# Patient Record
Sex: FEMALE | Race: WHITE | HISPANIC OR LATINO | Employment: FULL TIME | URBAN - METROPOLITAN AREA
[De-identification: names, ages, dates, MRNs, and addresses within clinical notes are randomized per-mention and may not be internally consistent; named-entity substitution may affect disease eponyms.]

---

## 2022-04-12 RX ORDER — FLUTICASONE PROPIONATE 50 MCG
2 SPRAY, SUSPENSION (ML) NASAL DAILY
COMMUNITY
Start: 2021-09-10 | End: 2022-04-14

## 2022-04-14 ENCOUNTER — OFFICE VISIT (OUTPATIENT)
Dept: FAMILY MEDICINE CLINIC | Facility: CLINIC | Age: 37
End: 2022-04-14
Payer: COMMERCIAL

## 2022-04-14 VITALS
RESPIRATION RATE: 18 BRPM | BODY MASS INDEX: 24.24 KG/M2 | OXYGEN SATURATION: 99 % | DIASTOLIC BLOOD PRESSURE: 72 MMHG | SYSTOLIC BLOOD PRESSURE: 128 MMHG | HEART RATE: 98 BPM | TEMPERATURE: 98.1 F | HEIGHT: 64 IN | WEIGHT: 142 LBS

## 2022-04-14 DIAGNOSIS — Z11.4 ENCOUNTER FOR SCREENING FOR HIV: ICD-10-CM

## 2022-04-14 DIAGNOSIS — Z12.4 SCREENING FOR CERVICAL CANCER: ICD-10-CM

## 2022-04-14 DIAGNOSIS — R53.83 OTHER FATIGUE: ICD-10-CM

## 2022-04-14 DIAGNOSIS — R21 RASH AND NONSPECIFIC SKIN ERUPTION: Primary | ICD-10-CM

## 2022-04-14 DIAGNOSIS — Z80.3 FAMILY HISTORY OF BREAST CANCER: ICD-10-CM

## 2022-04-14 DIAGNOSIS — R11.0 NAUSEA: ICD-10-CM

## 2022-04-14 DIAGNOSIS — E78.2 MIXED HYPERLIPIDEMIA: ICD-10-CM

## 2022-04-14 DIAGNOSIS — Z12.31 ENCOUNTER FOR SCREENING MAMMOGRAM FOR MALIGNANT NEOPLASM OF BREAST: ICD-10-CM

## 2022-04-14 DIAGNOSIS — Z11.59 ENCOUNTER FOR HEPATITIS C SCREENING TEST FOR LOW RISK PATIENT: ICD-10-CM

## 2022-04-14 DIAGNOSIS — R73.9 HYPERGLYCEMIA: ICD-10-CM

## 2022-04-14 PROCEDURE — 3008F BODY MASS INDEX DOCD: CPT | Performed by: NURSE PRACTITIONER

## 2022-04-14 PROCEDURE — 99204 OFFICE O/P NEW MOD 45 MIN: CPT | Performed by: NURSE PRACTITIONER

## 2022-04-14 PROCEDURE — 3725F SCREEN DEPRESSION PERFORMED: CPT | Performed by: NURSE PRACTITIONER

## 2022-04-14 NOTE — ASSESSMENT & PLAN NOTE
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

## 2022-04-14 NOTE — PROGRESS NOTES
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  She states she no longer stressed and no longer having nausea  Her LMP was 4/10/22  Advised if this returns to f/u in office  Pt in agreement with plan  Diagnoses and all orders for this visit:    Rash and nonspecific skin eruption  -     Ambulatory Referral to Dermatology; Future    Nausea    Other fatigue  -     CBC and differential; Future  -     TSH, 3rd generation with Free T4 reflex; Future    Hyperglycemia  -     Comprehensive metabolic panel; Future  -     Hemoglobin A1C; Future    Mixed hyperlipidemia  -     Lipid panel; Future    Screening for cervical cancer  -     Ambulatory Referral to Gynecology; Future    Encounter for screening for HIV  -     HIV 1/2 Antigen/Antibody (4th Generation) w Reflex [facility]; Future    Encounter for hepatitis C screening test for low risk patient  -     Hepatitis C antibody; Future    Encounter for screening mammogram for malignant neoplasm of breast  -     Mammo screening bilateral w 3d & cad; Future    Family history of breast cancer  -     Mammo screening bilateral w 3d & cad; Future    Other orders  -     Discontinue: fluticasone (FLONASE) 50 mcg/act nasal spray; 2 sprays into each nostril daily          Subjective:      Patient ID: Kristina Lloyd is a 43 y o  female  43year old female patient here to establish care   Was receiving care across the street in the past  PMhx: no significant past medical history  States that 2 weeks ago she was vomiting  States almost everyday she was vomiting  When looking at food she was nausea  She did not take any medications for this  Her LMP 4/10/22  States the vomiting resolved since she started eating fruits  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  Rash  This is a recurrent problem  The current episode started more than 1 year ago  The problem has been waxing and waning since onset  The affected locations include the left lower leg  The rash is characterized by redness and itchiness  It is unknown if there was an exposure to a precipitant  Pertinent negatives include no congestion, cough, eye pain, fever, nail changes, rhinorrhea, sore throat or vomiting  Past treatments include topical steroids  The treatment provided mild relief  There is no history of allergies, asthma, eczema or varicella  The following portions of the patient's history were reviewed and updated as appropriate: allergies, current medications, past family history, past medical history, past social history, past surgical history and problem list BMI Counseling: Body mass index is 24 37 kg/m²  The BMI is above normal  Nutrition recommendations include encouraging healthy choices of fruits and vegetables, decreasing fast food intake, consuming healthier snacks, limiting drinks that contain sugar, increasing intake of lean protein, reducing intake of saturated and trans fat and reducing intake of cholesterol  Exercise recommendations include exercising 3-5 times per week  Rationale for BMI follow-up plan is due to patient being overweight or obese  BMI Counseling: Body mass index is 24 37 kg/m²  The BMI is below normal  Patient advised to gain weight  Rationale for BMI follow-up plan is due to patient being underweight       Depression Screening and Follow-up Plan: Patient was screened for depression during today's encounter  They screened negative with a PHQ-2 score of 0       Review of Systems   Constitutional: Negative  Negative for fever  HENT: Negative  Negative for congestion, rhinorrhea and sore throat  Eyes: Negative  Negative for pain  Respiratory: Negative  Negative for cough  Cardiovascular: Negative  Gastrointestinal: Positive for nausea (2 weeks ago has resolved)  Negative for vomiting  Endocrine: Negative  Genitourinary: Negative  Musculoskeletal: Negative  Skin: Positive for rash  Negative for nail changes  Allergic/Immunologic: Negative  Neurological: Negative  Hematological: Negative  Psychiatric/Behavioral: Negative  Objective:      /72 (BP Location: Left arm, Patient Position: Sitting, Cuff Size: Standard)   Pulse 98   Temp 98 1 °F (36 7 °C) (Tympanic)   Resp 18   Ht 5' 4" (1 626 m)   Wt 64 4 kg (142 lb)   LMP 04/10/2022   SpO2 99%   BMI 24 37 kg/m²          Physical Exam  Vitals and nursing note reviewed  Constitutional:       General: She is not in acute distress  Appearance: Normal appearance  She is not ill-appearing  HENT:      Head: Normocephalic and atraumatic  Right Ear: External ear normal       Left Ear: External ear normal       Nose: Nose normal  No congestion or rhinorrhea  Mouth/Throat:      Pharynx: Oropharynx is clear  No oropharyngeal exudate  Eyes:      Conjunctiva/sclera: Conjunctivae normal    Cardiovascular:      Rate and Rhythm: Normal rate and regular rhythm  Pulses: Normal pulses  Heart sounds: Normal heart sounds  Pulmonary:      Effort: Pulmonary effort is normal  No respiratory distress  Breath sounds: Normal breath sounds  Abdominal:      General: Bowel sounds are normal  There is no distension  Palpations: Abdomen is soft  Tenderness: There is no abdominal tenderness   There is no right CVA tenderness or left CVA tenderness  Musculoskeletal:         General: Normal range of motion  Cervical back: Normal range of motion and neck supple  Skin:     General: Skin is warm and dry  Capillary Refill: Capillary refill takes less than 2 seconds  Findings: Erythema and rash present  Neurological:      General: No focal deficit present  Mental Status: She is alert and oriented to person, place, and time     Psychiatric:         Mood and Affect: Mood normal          Behavior: Behavior normal

## 2022-04-14 NOTE — ASSESSMENT & PLAN NOTE
-pt reports rash appeared years ago and does not resolve  States [place] and since then the rash has never gone fully away  States it itches her and turns red  She has used OTC hydrocortisone and topical steroids which help but do not take the rash away  I recommended possibly seeing derm as her rash has not resolved despite using treatment  Pt in agreement will refer to derm

## 2022-04-18 ENCOUNTER — APPOINTMENT (OUTPATIENT)
Dept: LAB | Facility: HOSPITAL | Age: 37
End: 2022-04-18
Payer: COMMERCIAL

## 2022-04-18 DIAGNOSIS — E78.2 MIXED HYPERLIPIDEMIA: ICD-10-CM

## 2022-04-18 DIAGNOSIS — Z11.59 ENCOUNTER FOR HEPATITIS C SCREENING TEST FOR LOW RISK PATIENT: ICD-10-CM

## 2022-04-18 DIAGNOSIS — Z11.4 ENCOUNTER FOR SCREENING FOR HIV: ICD-10-CM

## 2022-04-18 DIAGNOSIS — R73.9 HYPERGLYCEMIA: ICD-10-CM

## 2022-04-18 DIAGNOSIS — R53.83 OTHER FATIGUE: ICD-10-CM

## 2022-04-18 LAB
ALBUMIN SERPL BCP-MCNC: 4.2 G/DL (ref 3–5.2)
ALP SERPL-CCNC: 64 U/L (ref 43–122)
ALT SERPL W P-5'-P-CCNC: 15 U/L
ANION GAP SERPL CALCULATED.3IONS-SCNC: 5 MMOL/L (ref 5–14)
AST SERPL W P-5'-P-CCNC: 29 U/L (ref 14–36)
BASOPHILS # BLD AUTO: 0.04 THOUSANDS/ΜL (ref 0–0.1)
BASOPHILS NFR BLD AUTO: 1 % (ref 0–1)
BILIRUB SERPL-MCNC: 0.52 MG/DL
BUN SERPL-MCNC: 19 MG/DL (ref 5–25)
CALCIUM SERPL-MCNC: 9 MG/DL (ref 8.4–10.2)
CHLORIDE SERPL-SCNC: 104 MMOL/L (ref 97–108)
CHOLEST SERPL-MCNC: 202 MG/DL
CO2 SERPL-SCNC: 30 MMOL/L (ref 22–30)
CREAT SERPL-MCNC: 0.76 MG/DL (ref 0.6–1.2)
EOSINOPHIL # BLD AUTO: 0.15 THOUSAND/ΜL (ref 0–0.61)
EOSINOPHIL NFR BLD AUTO: 3 % (ref 0–6)
ERYTHROCYTE [DISTWIDTH] IN BLOOD BY AUTOMATED COUNT: 15.5 % (ref 11.6–15.1)
EST. AVERAGE GLUCOSE BLD GHB EST-MCNC: 108 MG/DL
GFR SERPL CREATININE-BSD FRML MDRD: 99 ML/MIN/1.73SQ M
GLUCOSE P FAST SERPL-MCNC: 89 MG/DL (ref 70–99)
HBA1C MFR BLD: 5.4 %
HCT VFR BLD AUTO: 31.1 % (ref 34.8–46.1)
HCV AB SER QL: NORMAL
HDLC SERPL-MCNC: 81 MG/DL
HGB BLD-MCNC: 8.9 G/DL (ref 11.5–15.4)
IMM GRANULOCYTES # BLD AUTO: 0.01 THOUSAND/UL (ref 0–0.2)
IMM GRANULOCYTES NFR BLD AUTO: 0 % (ref 0–2)
LDLC SERPL CALC-MCNC: 96 MG/DL
LYMPHOCYTES # BLD AUTO: 2.18 THOUSANDS/ΜL (ref 0.6–4.47)
LYMPHOCYTES NFR BLD AUTO: 38 % (ref 14–44)
MCH RBC QN AUTO: 24.2 PG (ref 26.8–34.3)
MCHC RBC AUTO-ENTMCNC: 28.6 G/DL (ref 31.4–37.4)
MCV RBC AUTO: 85 FL (ref 82–98)
MONOCYTES # BLD AUTO: 0.48 THOUSAND/ΜL (ref 0.17–1.22)
MONOCYTES NFR BLD AUTO: 8 % (ref 4–12)
NEUTROPHILS # BLD AUTO: 2.87 THOUSANDS/ΜL (ref 1.85–7.62)
NEUTS SEG NFR BLD AUTO: 50 % (ref 43–75)
NONHDLC SERPL-MCNC: 121 MG/DL
NRBC BLD AUTO-RTO: 0 /100 WBCS
PLATELET # BLD AUTO: 316 THOUSANDS/UL (ref 149–390)
PMV BLD AUTO: 10.5 FL (ref 8.9–12.7)
POTASSIUM SERPL-SCNC: 3.8 MMOL/L (ref 3.6–5)
PROT SERPL-MCNC: 7.7 G/DL (ref 5.9–8.4)
RBC # BLD AUTO: 3.68 MILLION/UL (ref 3.81–5.12)
SODIUM SERPL-SCNC: 139 MMOL/L (ref 137–147)
TRIGL SERPL-MCNC: 125 MG/DL
TSH SERPL DL<=0.05 MIU/L-ACNC: 4.03 UIU/ML (ref 0.45–4.5)
WBC # BLD AUTO: 5.73 THOUSAND/UL (ref 4.31–10.16)

## 2022-04-18 PROCEDURE — 36415 COLL VENOUS BLD VENIPUNCTURE: CPT

## 2022-04-18 PROCEDURE — 80053 COMPREHEN METABOLIC PANEL: CPT

## 2022-04-18 PROCEDURE — 87389 HIV-1 AG W/HIV-1&-2 AB AG IA: CPT

## 2022-04-18 PROCEDURE — 80061 LIPID PANEL: CPT

## 2022-04-18 PROCEDURE — 85025 COMPLETE CBC W/AUTO DIFF WBC: CPT

## 2022-04-18 PROCEDURE — 83036 HEMOGLOBIN GLYCOSYLATED A1C: CPT

## 2022-04-18 PROCEDURE — 86803 HEPATITIS C AB TEST: CPT

## 2022-04-18 PROCEDURE — 84443 ASSAY THYROID STIM HORMONE: CPT

## 2022-04-19 LAB — HIV 1+2 AB+HIV1 P24 AG SERPL QL IA: NORMAL

## 2022-05-26 ENCOUNTER — OFFICE VISIT (OUTPATIENT)
Dept: FAMILY MEDICINE CLINIC | Facility: CLINIC | Age: 37
End: 2022-05-26
Payer: COMMERCIAL

## 2022-05-26 VITALS
BODY MASS INDEX: 24.41 KG/M2 | HEART RATE: 95 BPM | HEIGHT: 64 IN | WEIGHT: 143 LBS | DIASTOLIC BLOOD PRESSURE: 70 MMHG | TEMPERATURE: 97.5 F | RESPIRATION RATE: 18 BRPM | OXYGEN SATURATION: 99 % | SYSTOLIC BLOOD PRESSURE: 126 MMHG

## 2022-05-26 DIAGNOSIS — D64.9 ANEMIA, UNSPECIFIED TYPE: ICD-10-CM

## 2022-05-26 DIAGNOSIS — Z00.00 ANNUAL PHYSICAL EXAM: Primary | ICD-10-CM

## 2022-05-26 PROCEDURE — 3008F BODY MASS INDEX DOCD: CPT | Performed by: NURSE PRACTITIONER

## 2022-05-26 PROCEDURE — 3725F SCREEN DEPRESSION PERFORMED: CPT | Performed by: NURSE PRACTITIONER

## 2022-05-26 PROCEDURE — 99395 PREV VISIT EST AGE 18-39: CPT | Performed by: NURSE PRACTITIONER

## 2022-05-26 RX ORDER — FERROUS SULFATE TAB EC 324 MG (65 MG FE EQUIVALENT) 324 (65 FE) MG
324 TABLET DELAYED RESPONSE ORAL
Qty: 60 TABLET | Refills: 3 | Status: SHIPPED | OUTPATIENT
Start: 2022-05-26

## 2022-05-26 NOTE — PATIENT INSTRUCTIONS

## 2022-05-26 NOTE — PROGRESS NOTES
ADULT ANNUAL [address] [facility] PRIMARY CARE [facility]    NAME: Tomi Aviles  AGE: 43 y o  SEX: female  : 1985     DATE: 2022     Assessment and Plan:     Problem List Items Addressed This Visit        Other    Annual physical exam - Primary     -Patient recommended healthy eating habits  Health risk assessment was discussed with patient also and the ways to stay healthier  Recommended a exercising frequently at least 5 days a week for 30 minutes at a time; such as joining a gym if not already enrolled or walking  Eating low-fat and low-cholesterol foods with avoidance of saturated fats or fried foods  Immunizations, and the need to comply with current CDC's recommendations were discussed  To follow up yearly for physical exams  Anemia    Relevant Medications    ferrous sulfate 324 (65 Fe) mg          Immunizations and preventive care screenings were discussed with patient today  Appropriate education was printed on patient's after visit summary  Counseling:  Alcohol/drug use: discussed moderation in alcohol intake, the recommendations for healthy alcohol use, and avoidance of illicit drug use  Dental Health: discussed importance of regular tooth brushing, flossing, and dental visits  Injury prevention: discussed safety/seat belts, safety helmets, smoke detectors, carbon dioxide detectors, and smoking near bedding or upholstery  Sexual health: discussed sexually transmitted diseases, partner selection, use of condoms, avoidance of unintended pregnancy, and contraceptive alternatives  · Exercise: the importance of regular exercise/physical activity was discussed  Recommend exercise 3-5 times per week for at least 30 minutes  BMI Counseling: Body mass index is 24 55 kg/m²   The BMI is above normal  Nutrition recommendations include encouraging healthy choices of fruits and vegetables, decreasing fast food intake, consuming healthier snacks, limiting drinks that contain sugar, increasing intake of lean protein, reducing intake of saturated and trans fat and reducing intake of cholesterol  Exercise recommendations include exercising 3-5 times per week  Rationale for BMI follow-up plan is due to patient being overweight or obese  BMI Counseling: Body mass index is 24 55 kg/m²  The BMI is below normal  Patient advised to gain weight  Rationale for BMI follow-up plan is due to patient being underweight  Depression Screening and Follow-up Plan: Patient was screened for depression during today's encounter  They screened negative with a PHQ-2 score of 0  No follow-ups on file  Chief Complaint:     Chief Complaint   Patient presents with    Physical Exam      History of Present Illness:     Adult Annual Physical   Patient here for a comprehensive physical exam  The patient reports no problems  Diet and Physical Activity  · Diet/Nutrition: well balanced diet and consuming 3-5 servings of fruits/vegetables daily  · Exercise: walking  Depression Screening  PHQ-2/9 Depression Screening    Little interest or pleasure in doing things: 0 - not at all  Feeling down, depressed, or hopeless: 0 - not at all  PHQ-2 Score: 0  PHQ-2 Interpretation: Negative depression screen       General Health  · Sleep: sleeps well  · Hearing: normal - bilateral   · Vision: no vision problems  · Dental: no dental visits for >1 year  /GYN Health  · Last menstrual period: 4/10/22  · Contraceptive method: nothing not active  · History of STDs?: no      Review of Systems:     Review of Systems   Constitutional: Negative  HENT: Negative  Eyes: Negative  Respiratory: Negative  Cardiovascular: Negative  Gastrointestinal: Negative  Endocrine: Negative  Genitourinary: Negative  Musculoskeletal: Negative  Skin: Negative  Allergic/Immunologic: Negative  Neurological: Negative      Hematological: Negative  Psychiatric/Behavioral: Negative  Past Medical History:     Past Medical History:   Diagnosis Date    Allergic     Anemia       Past Surgical History:     History reviewed  No pertinent surgical history  Social History:     Social History     Socioeconomic History    Marital status: Single     Spouse name: None    Number of children: None    Years of education: None    Highest education level: None   Occupational History    None   Tobacco Use    Smoking status: Never Smoker    Smokeless tobacco: Never Used   Substance and Sexual Activity    Alcohol use: Never    Drug use: Never    Sexual activity: Yes     Partners: Male     Birth control/protection: None   Other Topics Concern    None   Social History Narrative    None     Social Determinants of Health     Financial Resource Strain: Not on file   Food Insecurity: Not on file   Transportation Needs: Not on file   Physical Activity: Not on file   Stress: Not on file   Social Connections: Not on file   Intimate Partner Violence: Not on file   Housing Stability: Not on file      Family History:     Family History   Problem Relation Age of Onset    Breast cancer Mother       Current Medications:     Current Outpatient Medications   Medication Sig Dispense Refill    ferrous sulfate 324 (65 Fe) mg Take 1 tablet (324 mg total) by mouth 2 (two) times a day before meals 60 tablet 3     No current facility-administered medications for this visit  Allergies:     No Known Allergies   Physical Exam:     /70 (BP Location: Left arm, Patient Position: Sitting, Cuff Size: Standard)   Pulse 95   Temp 97 5 °F (36 4 °C) (Tympanic)   Resp 18   Ht 5' 4" (1 626 m)   Wt 64 9 kg (143 lb)   SpO2 99%   BMI 24 55 kg/m²     Physical Exam  Vitals and nursing note reviewed  Constitutional:       General: She is not in acute distress  Appearance: Normal appearance  She is not ill-appearing  HENT:      Head: Normocephalic and atraumatic  Right Ear: Tympanic membrane, ear canal and external ear normal  There is no impacted cerumen  Left Ear: Tympanic membrane, ear canal and external ear normal  There is no impacted cerumen  Nose: Nose normal  No congestion or rhinorrhea  Mouth/Throat:      Mouth: Mucous membranes are moist       Pharynx: Oropharynx is clear  No oropharyngeal exudate or posterior oropharyngeal erythema  Eyes:      Extraocular Movements: Extraocular movements intact  Conjunctiva/sclera: Conjunctivae normal       Pupils: Pupils are equal, round, and reactive to light  Cardiovascular:      Rate and Rhythm: Normal rate  Pulses: Normal pulses  Heart sounds: Normal heart sounds  Pulmonary:      Effort: Pulmonary effort is normal       Breath sounds: Normal breath sounds  Abdominal:      General: Bowel sounds are normal       Palpations: Abdomen is soft  Musculoskeletal:         General: No tenderness or deformity  Normal range of motion  Cervical back: Normal range of motion and neck supple  Skin:     General: Skin is warm and dry  Capillary Refill: Capillary refill takes less than 2 seconds  Neurological:      General: No focal deficit present  Mental Status: She is alert and oriented to person, place, and time     Psychiatric:         Mood and Affect: Mood normal          Behavior: Behavior normal           Isabella Valdes, [address]

## 2022-10-11 PROBLEM — Z12.4 SCREENING FOR CERVICAL CANCER: Status: RESOLVED | Noted: 2022-04-14 | Resolved: 2022-10-11

## 2023-11-29 ENCOUNTER — TELEPHONE (OUTPATIENT)
Dept: FAMILY MEDICINE CLINIC | Facility: CLINIC | Age: 38
End: 2023-11-29

## 2023-12-12 ENCOUNTER — OFFICE VISIT (OUTPATIENT)
Dept: FAMILY MEDICINE CLINIC | Facility: CLINIC | Age: 38
End: 2023-12-12
Payer: COMMERCIAL

## 2023-12-12 VITALS
TEMPERATURE: 97.9 F | DIASTOLIC BLOOD PRESSURE: 70 MMHG | OXYGEN SATURATION: 98 % | RESPIRATION RATE: 16 BRPM | HEART RATE: 86 BPM | BODY MASS INDEX: 26.68 KG/M2 | SYSTOLIC BLOOD PRESSURE: 102 MMHG | WEIGHT: 145 LBS | HEIGHT: 62 IN

## 2023-12-12 DIAGNOSIS — Z00.00 ANNUAL PHYSICAL EXAM: Primary | ICD-10-CM

## 2023-12-12 DIAGNOSIS — Z13.6 SCREENING FOR CARDIOVASCULAR CONDITION: ICD-10-CM

## 2023-12-12 DIAGNOSIS — L30.4 INTERTRIGO: ICD-10-CM

## 2023-12-12 DIAGNOSIS — Z12.31 SCREENING MAMMOGRAM FOR BREAST CANCER: ICD-10-CM

## 2023-12-12 DIAGNOSIS — D64.9 ANEMIA, UNSPECIFIED TYPE: ICD-10-CM

## 2023-12-12 PROBLEM — R11.0 NAUSEA: Status: RESOLVED | Noted: 2022-04-14 | Resolved: 2023-12-12

## 2023-12-12 PROBLEM — R21 RASH AND NONSPECIFIC SKIN ERUPTION: Status: RESOLVED | Noted: 2022-04-14 | Resolved: 2023-12-12

## 2023-12-12 PROCEDURE — 99213 OFFICE O/P EST LOW 20 MIN: CPT | Performed by: PHYSICIAN ASSISTANT

## 2023-12-12 PROCEDURE — 99396 PREV VISIT EST AGE 40-64: CPT | Performed by: PHYSICIAN ASSISTANT

## 2023-12-12 RX ORDER — NYSTATIN 100000 U/G
OINTMENT TOPICAL 2 TIMES DAILY
Qty: 30 G | Refills: 0 | Status: SHIPPED | OUTPATIENT
Start: 2023-12-12

## 2023-12-12 NOTE — ASSESSMENT & PLAN NOTE
BMI Counseling: Body mass index is 26.52 kg/m². The BMI is above normal. Nutrition recommendations include reducing portion sizes, decreasing overall calorie intake, 3-5 servings of fruits/vegetables daily, reducing fast food intake, and consuming healthier snacks. Exercise recommendations include exercising 3-5 times per week and strength training exercises.

## 2023-12-12 NOTE — ASSESSMENT & PLAN NOTE
Hyperpigmentation of mons pubis and folds between legs x 3 months. Start topical nystatin, follow-up in 2 weeks if no improvement.

## 2023-12-12 NOTE — PROGRESS NOTES
[address] [place] [address] PRIMARY CARE [facility]    NAME: [address]  AGE: 34 y.o. SEX: female  : 1985     DATE: 2023     Assessment and Plan:     Problem List Items Addressed This Visit        Musculoskeletal and Integument    Intertrigo     Hyperpigmentation of mons pubis and folds between legs x 3 months. Start topical nystatin, follow-up in 2 weeks if no improvement. Relevant Medications    nystatin (MYCOSTATIN) ointment       Other    Anemia     Lab Results   Component Value Date    WBC 5.73 2022    HGB 8.9 (L) 2022    HCT 31.1 (L) 2022    MCV 85 2022     2022     Unclear cause, regular menstrual periods lasting 3 days, no bloody stools, repeat labs, symptomatic with fatigue, consider EGD/[place] pending repeat lab. Relevant Orders    Iron Panel (Includes Ferritin, Iron Sat%, Iron, and TIBC)    Reticulocytes    Vitamin B12    Folate    RESOLVED: Annual physical exam - Primary    BMI 26.0-26.9,adult     BMI Counseling: Body mass index is 26.52 kg/m². The BMI is above normal. Nutrition recommendations include reducing portion sizes, decreasing overall calorie intake, 3-5 servings of fruits/vegetables daily, reducing fast food intake, and consuming healthier snacks. Exercise recommendations include exercising 3-5 times per week and strength training exercises. Other Visit Diagnoses     Screening mammogram for breast cancer        Relevant Orders    Mammo screening bilateral w 3d & cad    Screening for cardiovascular condition        Relevant Orders    CBC and differential    Comprehensive metabolic panel    Lipid panel            Immunizations and preventive care screenings were discussed with patient today. Appropriate education was printed on patient's after visit summary.     Counseling:  Alcohol/drug use: discussed moderation in alcohol intake, the recommendations for healthy alcohol use, and avoidance of illicit drug use. Dental Health: discussed importance of regular tooth brushing, flossing, and dental visits. Injury prevention: discussed safety/seat belts, safety helmets, smoke detectors, carbon dioxide detectors, and smoking near bedding or upholstery. Sexual health: discussed sexually transmitted diseases, partner selection, use of condoms, avoidance of unintended pregnancy, and contraceptive alternatives. Exercise: the importance of regular exercise/physical activity was discussed. Recommend exercise 3-5 times per week for at least 30 minutes. Depression Screening and Follow-up Plan: Patient was screened for depression during today's encounter. They screened negative with a PHQ-2 score of 0. Return for pap smear . Chief Complaint:     Chief Complaint   Patient presents with   • Physical Exam      History of Present Illness:     Adult Annual Physical   Patient here for a comprehensive physical exam. The patient reports problems - rash in genital region . Working in packing. Staying active, not sweating much, no recent travel or swimming. Shaving in the mons pubis region with new razor every time, noticing darkening of skin, no itching or swelling. Has not had Pap smear screening in several years. Never did mammogram before. History was conducted in [place] without the use of . Diet and Physical Activity  Diet/Nutrition: well balanced diet. Exercise: walking. Depression Screening  PHQ-2/9 Depression Screening    Little interest or pleasure in doing things: 0 - not at all  Feeling down, depressed, or hopeless: 0 - not at all  PHQ-2 Score: 0  PHQ-2 Interpretation: Negative depression screen       General Health  Sleep: sleeps well. Hearing: normal - bilateral.  Vision: no vision problems. Dental: no dental visits for >1 year and brushes teeth twice daily.        /GYN Health  Follows with gynecology? no   Patient is: premenopausal  Last menstrual period: 11/28/23, 3 days   Contraceptive method:  none . Advanced Care Planning  Do you have an advanced directive? no  Do you have a durable medical power of ? no     Review of Systems:     Review of Systems   Constitutional:  Negative for chills and fever. HENT:  Negative for ear pain and sore throat. Eyes:  Negative for pain and visual disturbance. Respiratory:  Negative for cough and shortness of breath. Cardiovascular:  Negative for chest pain and palpitations. Gastrointestinal:  Negative for abdominal pain and vomiting. Genitourinary:  Negative for dysuria and hematuria. Musculoskeletal:  Negative for arthralgias and back pain. Skin:  Positive for rash. Negative for color change. Neurological:  Negative for seizures and syncope. All other systems reviewed and are negative. Past Medical History:     Past Medical History:   Diagnosis Date   • Allergic    • Anemia       Past Surgical History:     History reviewed. No pertinent surgical history.    Social History:     Social History     Socioeconomic History   • Marital status: Single     Spouse name: None   • Number of children: None   • Years of education: None   • Highest education level: None   Occupational History   • None   Tobacco Use   • Smoking status: Never   • Smokeless tobacco: Never   Substance and Sexual Activity   • Alcohol use: Never   • Drug use: Never   • Sexual activity: Yes     Partners: Male     Birth control/protection: None   Other Topics Concern   • None   Social History Narrative   • None     Social Determinants of Health     Financial Resource Strain: Not on file   Food Insecurity: Not on file   Transportation Needs: Not on file   Physical Activity: Not on file   Stress: Not on file   Social Connections: Not on file   Intimate Partner Violence: Not on file   Housing Stability: Not on file      Family History:     Family History   Problem Relation Age of Onset   • Breast cancer Mother       Current Medications:     Current Outpatient Medications   Medication Sig Dispense Refill   • nystatin (MYCOSTATIN) ointment Apply topically 2 (two) times a day 30 g 0     No current facility-administered medications for this visit. Allergies:     No Known Allergies   Physical Exam:     /70 (BP Location: Left arm, Patient Position: Sitting, Cuff Size: Standard)   Pulse 86   Temp 97.9 °F (36.6 °C) (Tympanic)   Resp 16   Ht 5' 2" (1.575 m)   Wt 65.8 kg (145 lb)   SpO2 98%   BMI 26.52 kg/m²     Physical Exam  Vitals and nursing note reviewed. Constitutional:       General: She is not in acute distress. Appearance: She is well-developed. HENT:      Head: Normocephalic and atraumatic. Right Ear: Tympanic membrane, ear canal and external ear normal.      Left Ear: Tympanic membrane, ear canal and external ear normal.   Eyes:      Extraocular Movements: Extraocular movements intact. Conjunctiva/sclera: Conjunctivae normal.      Pupils: Pupils are equal, round, and reactive to light. Cardiovascular:      Rate and Rhythm: Normal rate and regular rhythm. Heart sounds: No murmur heard. Pulmonary:      Effort: Pulmonary effort is normal. No respiratory distress. Breath sounds: Normal breath sounds. Abdominal:      Palpations: Abdomen is soft. Tenderness: There is no abdominal tenderness. Musculoskeletal:         General: No swelling. Cervical back: Neck supple. Skin:     General: Skin is warm and dry. Capillary Refill: Capillary refill takes less than 2 seconds. Neurological:      Mental Status: She is alert.    Psychiatric:         Mood and Affect: Mood normal.          Kellie Connelly PA-C  [address]

## 2023-12-12 NOTE — ASSESSMENT & PLAN NOTE
Lab Results   Component Value Date    WBC 5.73 04/18/2022    HGB 8.9 (L) 04/18/2022    HCT 31.1 (L) 04/18/2022    MCV 85 04/18/2022     04/18/2022     Unclear cause, regular menstrual periods lasting 3 days, no bloody stools, repeat labs, symptomatic with fatigue, consider EGD/[place] pending repeat lab.

## 2023-12-16 ENCOUNTER — LAB (OUTPATIENT)
Dept: LAB | Facility: HOSPITAL | Age: 38
End: 2023-12-16
Payer: COMMERCIAL

## 2023-12-16 DIAGNOSIS — Z13.6 SCREENING FOR CARDIOVASCULAR CONDITION: ICD-10-CM

## 2023-12-16 DIAGNOSIS — D64.9 ANEMIA, UNSPECIFIED TYPE: ICD-10-CM

## 2023-12-16 LAB
ALBUMIN SERPL BCP-MCNC: 4.5 G/DL (ref 3.5–5)
ALP SERPL-CCNC: 54 U/L (ref 34–104)
ALT SERPL W P-5'-P-CCNC: 9 U/L (ref 7–52)
ANION GAP SERPL CALCULATED.3IONS-SCNC: 8 MMOL/L
ANISOCYTOSIS BLD QL SMEAR: PRESENT
AST SERPL W P-5'-P-CCNC: 17 U/L (ref 13–39)
BASOPHILS # BLD AUTO: 0.04 THOUSANDS/ÂΜL (ref 0–0.1)
BASOPHILS NFR BLD AUTO: 1 % (ref 0–1)
BILIRUB SERPL-MCNC: 0.53 MG/DL (ref 0.2–1)
BUN SERPL-MCNC: 20 MG/DL (ref 5–25)
CALCIUM SERPL-MCNC: 9.1 MG/DL (ref 8.4–10.2)
CHLORIDE SERPL-SCNC: 104 MMOL/L (ref 96–108)
CHOLEST SERPL-MCNC: 189 MG/DL
CO2 SERPL-SCNC: 26 MMOL/L (ref 21–32)
CREAT SERPL-MCNC: 0.74 MG/DL (ref 0.6–1.3)
EOSINOPHIL # BLD AUTO: 0.16 THOUSAND/ÂΜL (ref 0–0.61)
EOSINOPHIL NFR BLD AUTO: 3 % (ref 0–6)
ERYTHROCYTE [DISTWIDTH] IN BLOOD BY AUTOMATED COUNT: 15.1 % (ref 11.6–15.1)
FERRITIN SERPL-MCNC: 6 NG/ML (ref 11–307)
FOLATE SERPL-MCNC: >22.3 NG/ML
GFR SERPL CREATININE-BSD FRML MDRD: 101 ML/MIN/1.73SQ M
GLUCOSE P FAST SERPL-MCNC: 93 MG/DL (ref 65–99)
HCT VFR BLD AUTO: 31.7 % (ref 34.8–46.1)
HDLC SERPL-MCNC: 70 MG/DL
HGB BLD-MCNC: 10 G/DL (ref 11.5–15.4)
IMM GRANULOCYTES # BLD AUTO: 0.01 THOUSAND/UL (ref 0–0.2)
IMM GRANULOCYTES NFR BLD AUTO: 0 % (ref 0–2)
IRON SATN MFR SERPL: 14 % (ref 15–50)
IRON SERPL-MCNC: 57 UG/DL (ref 50–212)
LDLC SERPL CALC-MCNC: 106 MG/DL (ref 0–100)
LYMPHOCYTES # BLD AUTO: 1.72 THOUSANDS/ÂΜL (ref 0.6–4.47)
LYMPHOCYTES NFR BLD AUTO: 35 % (ref 14–44)
MCH RBC QN AUTO: 26.2 PG (ref 26.8–34.3)
MCHC RBC AUTO-ENTMCNC: 31.5 G/DL (ref 31.4–37.4)
MCV RBC AUTO: 83 FL (ref 82–98)
MONOCYTES # BLD AUTO: 0.5 THOUSAND/ÂΜL (ref 0.17–1.22)
MONOCYTES NFR BLD AUTO: 10 % (ref 4–12)
NEUTROPHILS # BLD AUTO: 2.56 THOUSANDS/ÂΜL (ref 1.85–7.62)
NEUTS SEG NFR BLD AUTO: 51 % (ref 43–75)
NONHDLC SERPL-MCNC: 119 MG/DL
NRBC BLD AUTO-RTO: 0 /100 WBCS
PLATELET # BLD AUTO: 259 THOUSANDS/UL (ref 149–390)
PLATELET BLD QL SMEAR: ADEQUATE
PMV BLD AUTO: 11 FL (ref 8.9–12.7)
POTASSIUM SERPL-SCNC: 4.2 MMOL/L (ref 3.5–5.3)
PROT SERPL-MCNC: 7.7 G/DL (ref 6.4–8.4)
RBC # BLD AUTO: 3.81 MILLION/UL (ref 3.81–5.12)
RBC MORPH BLD: PRESENT
RETICS # AUTO: NORMAL 10*3/UL (ref 14097–95744)
RETICS # CALC: 0.66 % (ref 0.37–1.87)
SCHISTOCYTES BLD QL SMEAR: PRESENT
SODIUM SERPL-SCNC: 138 MMOL/L (ref 135–147)
TIBC SERPL-MCNC: 416 UG/DL (ref 250–450)
TRIGL SERPL-MCNC: 67 MG/DL
UIBC SERPL-MCNC: 359 UG/DL (ref 155–355)
VIT B12 SERPL-MCNC: 255 PG/ML (ref 180–914)
WBC # BLD AUTO: 4.99 THOUSAND/UL (ref 4.31–10.16)

## 2023-12-16 PROCEDURE — 36415 COLL VENOUS BLD VENIPUNCTURE: CPT

## 2023-12-16 PROCEDURE — 82728 ASSAY OF FERRITIN: CPT

## 2023-12-16 PROCEDURE — 82607 VITAMIN B-12: CPT

## 2023-12-16 PROCEDURE — 80053 COMPREHEN METABOLIC PANEL: CPT

## 2023-12-16 PROCEDURE — 85025 COMPLETE CBC W/AUTO DIFF WBC: CPT

## 2023-12-16 PROCEDURE — 80061 LIPID PANEL: CPT

## 2023-12-16 PROCEDURE — 83540 ASSAY OF IRON: CPT

## 2023-12-16 PROCEDURE — 85045 AUTOMATED RETICULOCYTE COUNT: CPT

## 2023-12-16 PROCEDURE — 83550 IRON BINDING TEST: CPT

## 2023-12-16 PROCEDURE — 82746 ASSAY OF FOLIC ACID SERUM: CPT

## 2023-12-20 DIAGNOSIS — D50.8 OTHER IRON DEFICIENCY ANEMIA: Primary | ICD-10-CM

## 2023-12-20 RX ORDER — FERROUS SULFATE 324(65)MG
324 TABLET, DELAYED RELEASE (ENTERIC COATED) ORAL
Qty: 60 TABLET | Refills: 5 | Status: SHIPPED | OUTPATIENT
Start: 2023-12-20

## 2023-12-20 NOTE — PROGRESS NOTES
I reviewed patient's blood work as it reports schistocytes and anisocytosis, low ferritin, low iron saturation, high UIBC and Borderline low vitamin B-12.   Normal folate.  0.66 RPI.    Patient has anemia due to iron deficiency.    Further consideration about thrombotic microangiopathy TMA, due to  schistocytosis.    Need for further testing to confirm B12: MMA and homocysteine. (?)    Interesting the poor response of reticulocytes production. Likely to be chronic, concurrent hemoglobinopathy needs to be considered.    Follow up after iron repletion.

## 2024-01-17 ENCOUNTER — OFFICE VISIT (OUTPATIENT)
Dept: FAMILY MEDICINE CLINIC | Facility: CLINIC | Age: 39
End: 2024-01-17
Payer: COMMERCIAL

## 2024-01-17 VITALS
HEART RATE: 88 BPM | OXYGEN SATURATION: 99 % | RESPIRATION RATE: 17 BRPM | BODY MASS INDEX: 27.31 KG/M2 | DIASTOLIC BLOOD PRESSURE: 70 MMHG | SYSTOLIC BLOOD PRESSURE: 116 MMHG | TEMPERATURE: 97 F | WEIGHT: 148.4 LBS | HEIGHT: 62 IN

## 2024-01-17 DIAGNOSIS — Z12.4 SCREENING FOR MALIGNANT NEOPLASM OF CERVIX: Primary | ICD-10-CM

## 2024-01-17 DIAGNOSIS — L30.4 INTERTRIGO: ICD-10-CM

## 2024-01-17 DIAGNOSIS — D64.9 ANEMIA, UNSPECIFIED TYPE: ICD-10-CM

## 2024-01-17 PROCEDURE — 99214 OFFICE O/P EST MOD 30 MIN: CPT | Performed by: PHYSICIAN ASSISTANT

## 2024-01-17 PROCEDURE — G0145 SCR C/V CYTO,THINLAYER,RESCR: HCPCS | Performed by: PHYSICIAN ASSISTANT

## 2024-01-17 RX ORDER — LANOLIN ALCOHOL/MO/W.PET/CERES
1000 CREAM (GRAM) TOPICAL DAILY
Qty: 90 TABLET | Refills: 3 | Status: SHIPPED | OUTPATIENT
Start: 2024-01-17

## 2024-01-17 NOTE — PROGRESS NOTES
Name: Star Deluna      : 1985      MRN: [id number]  Encounter Provider: Fiona Naik PA-C  Encounter Date: 2024   Encounter department: [facility] CARE [facility]    Assessment & Plan     1. Screening for malignant neoplasm of cervix  Comments:  normal appearing cervix besides nabothian cysts present x4  Orders:  -     Cervical or vaginal cytopath, thin prep; Future  -     Cervical or vaginal cytopath, thin prep    2. Anemia, unspecified type  Assessment & Plan:  Lab Results   Component Value Date    IRON 57 2023    TIBC 416 2023    FERRITIN 6 (L) 2023     Lab Results   Component Value Date    WBC 4.99 2023    HGB 10.0 (L) 2023    HCT 31.7 (L) 2023    MCV 83 2023     2023     Lab Results   Component Value Date    [id number] 255 2023       Improved from previous, LMP 23, regular, not too heavy. Does not eat red meat, recommend increase iron in diet, start slow Fe as she did not tolerate ferrous sulfate. No bloody stools or gastritis. Repeat CBC in 3 months. Slight low B12, start supplement 1000mcg daily.     Orders:  -     vitamin B-12 (VITAMIN B-12) 1,000 mcg tablet; Take 1 tablet (1,000 mcg total) by mouth daily    3. Intertrigo  Assessment & Plan:  Hyperpigmentation present of mons pubis and folds between legs with improvement with nystatin. Still with itching, darken coloration improved, will trial nystatin/triam topically x 2 weeks. Caution with risk of steroids. Follow-up if no further improvement.     Orders:  -     nystatin-triamcinolone (MYCOLOG-II) cream; Apply topically 2 (two) times a day X 2 semanas           Subjective      Star is a 38 y.o. female with a h/o tubal ligation and anemia who presents for pap smear. She reports her partner is 4 years younger and wants to have a child. Interested in learning more about IVF or reversal surgery for tubal ligation. LMP 23, very  "regular and not heavy. Has her own children but is in good health and willing to conceive again if possible. Sometimes fatigue. Not eating red meat. Eats a lot of cheese, bread, rice. No smoking or ETOH use.     History was conducted in [place] without the use of .          Review of Systems   Constitutional:  Positive for fatigue. Negative for fever and unexpected weight change.   Respiratory:  Negative for shortness of breath.    Cardiovascular:  Negative for chest pain.   Genitourinary:  Negative for dyspareunia, menstrual problem, pelvic pain, vaginal bleeding, vaginal discharge and vaginal pain.       Current Outpatient Medications on File Prior to Visit   Medication Sig   • nystatin (MYCOSTATIN) ointment Apply topically 2 (two) times a day   • [DISCONTINUED] ferrous sulfate 324 (65 Fe) mg Take 1 tablet (324 mg total) by mouth 2 (two) times a day before meals (Patient not taking: Reported on 1/17/2024)       Objective     /70 (BP Location: Left arm, Patient Position: Sitting, Cuff Size: Large)   Pulse 88   Temp (!) 97 °F (36.1 °C) (Tympanic)   Resp 17   Ht 5' 2\" (1.575 m)   Wt 67.3 kg (148 lb 6.4 oz)   SpO2 99%   BMI 27.14 kg/m²     Physical Exam  Vitals and nursing note reviewed. Exam conducted with a chaperone present (Moni CONTRERAS present for exam).   Constitutional:       Appearance: Normal appearance.   HENT:      Head: Normocephalic and atraumatic.   Cardiovascular:      Rate and Rhythm: Normal rate and regular rhythm.      Pulses: Normal pulses.      Heart sounds: Normal heart sounds.   Pulmonary:      Effort: Pulmonary effort is normal.      Breath sounds: Normal breath sounds.   Genitourinary:     General: Normal vulva.      Exam position: Supine.      Pubic Area: No rash or pubic lice.       Labia:         Right: Rash present. No tenderness, lesion or injury.         Left: Rash present. No tenderness, lesion or injury.       Urethra: No prolapse, urethral pain, urethral swelling " or urethral lesion.      Vagina: Normal.      Cervix: Normal.      Uterus: Normal. Not tender and no uterine prolapse.       Adnexa:         Right: No tenderness.          Left: No tenderness.              Comments: Pap collected  Neurological:      Mental Status: She is alert and oriented to person, place, and time. Mental status is at baseline.       Fiona Naik PA-C

## 2024-01-17 NOTE — PATIENT INSTRUCTIONS
Estimada Brendaliz, jeffery niveles en la arias indican que tiene anemia. Usted no tiene suficiente david.     Tambien, le recomiendo que aumente los siguientes alimentos en christian dieta:  · Tofu  · Frijoles y lentejas  · Verduras de hojas elvin oscuro   Combinar brian de david con alimentos ricos en vitamina C puede ayudar a absorber el david por ejemplo:  · Frutas cítricas eduardo las naranjas  · Bayas  · Papaya  · Tomates  · Batatas  · Brócoli  · Repollo  · Verduras de hojas elvin oscuro  https://medlineplus.gov/[place]/ency/article/206051.htm

## 2024-01-17 NOTE — ASSESSMENT & PLAN NOTE
Lab Results   Component Value Date    IRON 57 12/16/2023    TIBC 416 12/16/2023    FERRITIN 6 (L) 12/16/2023     Lab Results   Component Value Date    WBC 4.99 12/16/2023    HGB 10.0 (L) 12/16/2023    HCT 31.7 (L) 12/16/2023    MCV 83 12/16/2023     12/16/2023     Lab Results   Component Value Date    [id number] 255 12/16/2023       Improved from previous, LMP 12/28/23, regular, not too heavy. Does not eat red meat, recommend increase iron in diet, start slow Fe as she did not tolerate ferrous sulfate. No bloody stools or gastritis. Repeat CBC in 3 months. Slight low B12, start supplement 1000mcg daily.

## 2024-01-17 NOTE — ASSESSMENT & PLAN NOTE
Hyperpigmentation present of mons pubis and folds between legs with improvement with nystatin. Still with itching, darken coloration improved, will trial nystatin/triam topically x 2 weeks. Caution with risk of steroids. Follow-up if no further improvement.

## 2024-01-19 ENCOUNTER — TELEPHONE (OUTPATIENT)
Dept: FAMILY MEDICINE CLINIC | Facility: CLINIC | Age: 39
End: 2024-01-19

## 2024-01-23 LAB
LAB AP GYN PRIMARY INTERPRETATION: NORMAL
Lab: NORMAL

## 2024-04-26 ENCOUNTER — APPOINTMENT (OUTPATIENT)
Dept: LAB | Facility: HOSPITAL | Age: 39
End: 2024-04-26
Payer: COMMERCIAL

## 2024-04-26 ENCOUNTER — TELEPHONE (OUTPATIENT)
Age: 39
End: 2024-04-26

## 2024-04-26 DIAGNOSIS — D50.8 OTHER IRON DEFICIENCY ANEMIA: Primary | ICD-10-CM

## 2024-04-26 LAB
BASOPHILS # BLD AUTO: 0.03 THOUSANDS/ÂΜL (ref 0–0.1)
BASOPHILS NFR BLD AUTO: 1 % (ref 0–1)
EOSINOPHIL # BLD AUTO: 0.1 THOUSAND/ÂΜL (ref 0–0.61)
EOSINOPHIL NFR BLD AUTO: 2 % (ref 0–6)
ERYTHROCYTE [DISTWIDTH] IN BLOOD BY AUTOMATED COUNT: 14.6 % (ref 11.6–15.1)
FERRITIN SERPL-MCNC: 4 NG/ML (ref 11–307)
HCT VFR BLD AUTO: 31.9 % (ref 34.8–46.1)
HGB BLD-MCNC: 9.9 G/DL (ref 11.5–15.4)
IMM GRANULOCYTES # BLD AUTO: 0.01 THOUSAND/UL (ref 0–0.2)
IMM GRANULOCYTES NFR BLD AUTO: 0 % (ref 0–2)
IRON SATN MFR SERPL: 9 % (ref 15–50)
IRON SERPL-MCNC: 37 UG/DL (ref 50–212)
LYMPHOCYTES # BLD AUTO: 1.56 THOUSANDS/ÂΜL (ref 0.6–4.47)
LYMPHOCYTES NFR BLD AUTO: 29 % (ref 14–44)
MCH RBC QN AUTO: 25.5 PG (ref 26.8–34.3)
MCHC RBC AUTO-ENTMCNC: 31 G/DL (ref 31.4–37.4)
MCV RBC AUTO: 82 FL (ref 82–98)
MONOCYTES # BLD AUTO: 0.38 THOUSAND/ÂΜL (ref 0.17–1.22)
MONOCYTES NFR BLD AUTO: 7 % (ref 4–12)
NEUTROPHILS # BLD AUTO: 3.3 THOUSANDS/ÂΜL (ref 1.85–7.62)
NEUTS SEG NFR BLD AUTO: 61 % (ref 43–75)
NRBC BLD AUTO-RTO: 0 /100 WBCS
PLATELET # BLD AUTO: 297 THOUSANDS/UL (ref 149–390)
PMV BLD AUTO: 10.5 FL (ref 8.9–12.7)
RBC # BLD AUTO: 3.88 MILLION/UL (ref 3.81–5.12)
TIBC SERPL-MCNC: 415 UG/DL (ref 250–450)
UIBC SERPL-MCNC: 378 UG/DL (ref 155–355)
WBC # BLD AUTO: 5.38 THOUSAND/UL (ref 4.31–10.16)

## 2024-04-26 PROCEDURE — 82728 ASSAY OF FERRITIN: CPT

## 2024-04-26 PROCEDURE — 83550 IRON BINDING TEST: CPT

## 2024-04-26 PROCEDURE — 36415 COLL VENOUS BLD VENIPUNCTURE: CPT

## 2024-04-26 PROCEDURE — 83540 ASSAY OF IRON: CPT

## 2024-04-26 PROCEDURE — 85025 COMPLETE CBC W/AUTO DIFF WBC: CPT

## 2024-04-26 NOTE — TELEPHONE ENCOUNTER
Pt called in regards to checking if the lab order for the iron was still good. I confirmed it was

## 2024-04-30 ENCOUNTER — OFFICE VISIT (OUTPATIENT)
Dept: FAMILY MEDICINE CLINIC | Facility: CLINIC | Age: 39
End: 2024-04-30
Payer: COMMERCIAL

## 2024-04-30 VITALS
RESPIRATION RATE: 16 BRPM | OXYGEN SATURATION: 99 % | WEIGHT: 148.2 LBS | HEART RATE: 60 BPM | DIASTOLIC BLOOD PRESSURE: 68 MMHG | HEIGHT: 62 IN | SYSTOLIC BLOOD PRESSURE: 110 MMHG | TEMPERATURE: 96.9 F | BODY MASS INDEX: 27.27 KG/M2

## 2024-04-30 DIAGNOSIS — E53.8 B12 DEFICIENCY: ICD-10-CM

## 2024-04-30 DIAGNOSIS — R53.83 OTHER FATIGUE: ICD-10-CM

## 2024-04-30 DIAGNOSIS — L30.4 INTERTRIGO: ICD-10-CM

## 2024-04-30 DIAGNOSIS — D50.0 IRON DEFICIENCY ANEMIA DUE TO CHRONIC BLOOD LOSS: Primary | ICD-10-CM

## 2024-04-30 PROCEDURE — 99214 OFFICE O/P EST MOD 30 MIN: CPT | Performed by: PHYSICIAN ASSISTANT

## 2024-04-30 PROCEDURE — 3725F SCREEN DEPRESSION PERFORMED: CPT | Performed by: PHYSICIAN ASSISTANT

## 2024-04-30 RX ORDER — SODIUM CHLORIDE 9 MG/ML
20 INJECTION, SOLUTION INTRAVENOUS ONCE
OUTPATIENT
Start: 2024-05-10

## 2024-04-30 NOTE — ASSESSMENT & PLAN NOTE
Lab Results   Component Value Date    [id number] 255 12/16/2023     Recommend continue B12 supplement daily.

## 2024-04-30 NOTE — ASSESSMENT & PLAN NOTE
Suspect due to iron deficiency and anemia. Start iron infusions due to no improvement with iron supplement.

## 2024-04-30 NOTE — PROGRESS NOTES
Name: Star Deluna      : 1985      MRN: [id number]  Encounter Provider: Fiona Naik PA-C  Encounter Date: 2024   Encounter department: [facility] PRIMARY CARE [facility]    Assessment & Plan     1. Iron deficiency anemia due to chronic blood loss  Assessment & Plan:  Lab Results   Component Value Date    WBC 5.38 2024    HGB 9.9 (L) 2024    HCT 31.9 (L) 2024    MCV 82 2024     2024     Lab Results   Component Value Date    IRON 37 (L) 2024    TIBC 415 2024    FERRITIN 4 (L) 2024     Lab Results   Component Value Date    [id number] 255 2023     With fatigue. No improvement with daily iron supplements and diet rich in iron. Regular menstrual cycles. LMP 24 lasting 2 days, no dark or tarry stools. Start iron infusions 100mg over 1 hour weekly x1 month, repeat lab. Consider EGD/[facility] pending response.     Orders:  -     CBC and differential; Future; Expected date: 2024  -     Iron Panel (Includes Ferritin, Iron Sat%, Iron, and TIBC); Future; Expected date: 2024    2. Other fatigue  Assessment & Plan:  Suspect due to iron deficiency and anemia. Start iron infusions due to no improvement with iron supplement.       3. Intertrigo  Assessment & Plan:  Improved with nystatin. Continue as needed.       4. B12 deficiency  Assessment & Plan:  Lab Results   Component Value Date    [id number] 255 2023     Recommend continue B12 supplement daily.            Ada Graves is a 38 y.o. female with a h/o tubal ligation and anemia who presents for follow-up after recent labs with iron deficiency. LMP 24, not heavy, lasts 2 days, no clots. Eating beets, green juices, taking iron and MVI supplement.    History was conducted in Bengali without the use of .        Review of Systems   Constitutional:  Positive for fatigue. Negative for fever and unexpected weight change.  "  Respiratory:  Negative for shortness of breath.    Cardiovascular:  Negative for chest pain and palpitations.   Gastrointestinal:  Negative for blood in stool.   Genitourinary:  Negative for menstrual problem.       Current Outpatient Medications on File Prior to Visit   Medication Sig   • nystatin (MYCOSTATIN) ointment Apply topically 2 (two) times a day   • nystatin-triamcinolone (MYCOLOG-II) cream Apply topically 2 (two) times a day X 2 semanas   • vitamin B-12 (VITAMIN B-12) 1,000 mcg tablet Take 1 tablet (1,000 mcg total) by mouth daily       Objective     /68 (BP Location: Left arm, Patient Position: Sitting, Cuff Size: Standard)   Pulse 60   Temp (!) 96.9 °F (36.1 °C) (Tympanic)   Resp 16   Ht 5' 2\" (1.575 m)   Wt 67.2 kg (148 lb 3.2 oz)   LMP 04/26/2024   SpO2 99%   BMI 27.11 kg/m²     Physical Exam  Vitals and nursing note reviewed.   Constitutional:       Appearance: Normal appearance.   HENT:      Head: Normocephalic and atraumatic.   Cardiovascular:      Rate and Rhythm: Normal rate and regular rhythm.      Pulses: Normal pulses.      Heart sounds: Normal heart sounds.   Pulmonary:      Effort: Pulmonary effort is normal.      Breath sounds: Normal breath sounds.   Skin:     Coloration: Skin is pale.   Neurological:      Mental Status: She is alert and oriented to person, place, and time. Mental status is at baseline.       Fiona Naik PA-C    "

## 2024-04-30 NOTE — ASSESSMENT & PLAN NOTE
Lab Results   Component Value Date    WBC 5.38 04/26/2024    HGB 9.9 (L) 04/26/2024    HCT 31.9 (L) 04/26/2024    MCV 82 04/26/2024     04/26/2024     Lab Results   Component Value Date    IRON 37 (L) 04/26/2024    TIBC 415 04/26/2024    FERRITIN 4 (L) 04/26/2024     Lab Results   Component Value Date    [id number] 255 12/16/2023     With fatigue. No improvement with daily iron supplements and diet rich in iron. Regular menstrual cycles. LMP 4/26/24 lasting 2 days, no dark or tarry stools. Start iron infusions 100mg over 1 hour weekly x1 month, repeat lab. Consider EGD/[facility] pending response.

## 2024-05-10 ENCOUNTER — HOSPITAL ENCOUNTER (OUTPATIENT)
Dept: INFUSION CENTER | Facility: HOSPITAL | Age: 39
End: 2024-05-10
Payer: COMMERCIAL

## 2024-05-10 VITALS
OXYGEN SATURATION: 99 % | HEART RATE: 80 BPM | DIASTOLIC BLOOD PRESSURE: 80 MMHG | SYSTOLIC BLOOD PRESSURE: 132 MMHG | TEMPERATURE: 98.2 F

## 2024-05-10 DIAGNOSIS — D50.0 IRON DEFICIENCY ANEMIA DUE TO CHRONIC BLOOD LOSS: ICD-10-CM

## 2024-05-10 DIAGNOSIS — R53.83 OTHER FATIGUE: Primary | ICD-10-CM

## 2024-05-10 PROCEDURE — 96365 THER/PROPH/DIAG IV INF INIT: CPT

## 2024-05-10 RX ORDER — SODIUM CHLORIDE 9 MG/ML
20 INJECTION, SOLUTION INTRAVENOUS ONCE
Status: CANCELLED | OUTPATIENT
Start: 2024-05-17

## 2024-05-10 RX ORDER — SODIUM CHLORIDE 9 MG/ML
20 INJECTION, SOLUTION INTRAVENOUS ONCE
Status: COMPLETED | OUTPATIENT
Start: 2024-05-10 | End: 2024-05-10

## 2024-05-10 RX ADMIN — SODIUM CHLORIDE 20 ML/HR: 9 INJECTION, SOLUTION INTRAVENOUS at 13:11

## 2024-05-10 RX ADMIN — IRON SUCROSE 100 MG: 20 INJECTION, SOLUTION INTRAVENOUS at 13:11

## 2024-05-10 NOTE — PROGRESS NOTES
Patient tolerated IV venofer without issue. [facility] AVS given.

## 2024-05-17 ENCOUNTER — HOSPITAL ENCOUNTER (OUTPATIENT)
Dept: INFUSION CENTER | Facility: HOSPITAL | Age: 39
End: 2024-05-17
Payer: COMMERCIAL

## 2024-05-17 VITALS
RESPIRATION RATE: 20 BRPM | TEMPERATURE: 97.7 F | DIASTOLIC BLOOD PRESSURE: 60 MMHG | SYSTOLIC BLOOD PRESSURE: 109 MMHG | HEART RATE: 67 BPM

## 2024-05-17 DIAGNOSIS — R53.83 OTHER FATIGUE: Primary | ICD-10-CM

## 2024-05-17 DIAGNOSIS — D50.0 IRON DEFICIENCY ANEMIA DUE TO CHRONIC BLOOD LOSS: ICD-10-CM

## 2024-05-17 RX ORDER — SODIUM CHLORIDE 9 MG/ML
20 INJECTION, SOLUTION INTRAVENOUS ONCE
Status: COMPLETED | OUTPATIENT
Start: 2024-05-17 | End: 2024-05-17

## 2024-05-17 RX ORDER — SODIUM CHLORIDE 9 MG/ML
20 INJECTION, SOLUTION INTRAVENOUS ONCE
Status: CANCELLED | OUTPATIENT
Start: 2024-05-24

## 2024-05-17 RX ADMIN — SODIUM CHLORIDE 20 ML/HR: 0.9 INJECTION, SOLUTION INTRAVENOUS at 13:13

## 2024-05-17 RX ADMIN — IRON SUCROSE 100 MG: 20 INJECTION, SOLUTION INTRAVENOUS at 13:32

## 2024-05-17 NOTE — PROGRESS NOTES
Pt tolerated IV Venofer  treatment today with no adverse reactions. Declined AVS, Next apt 5/24/24 @ 1:30. Left unit ambulatory with a steady gait.

## 2024-05-24 ENCOUNTER — HOSPITAL ENCOUNTER (OUTPATIENT)
Dept: INFUSION CENTER | Facility: HOSPITAL | Age: 39
End: 2024-05-24
Payer: COMMERCIAL

## 2024-05-24 VITALS
SYSTOLIC BLOOD PRESSURE: 143 MMHG | DIASTOLIC BLOOD PRESSURE: 83 MMHG | RESPIRATION RATE: 18 BRPM | TEMPERATURE: 98.3 F | HEART RATE: 98 BPM

## 2024-05-24 DIAGNOSIS — D50.0 IRON DEFICIENCY ANEMIA DUE TO CHRONIC BLOOD LOSS: ICD-10-CM

## 2024-05-24 DIAGNOSIS — R53.83 OTHER FATIGUE: Primary | ICD-10-CM

## 2024-05-24 PROCEDURE — 96365 THER/PROPH/DIAG IV INF INIT: CPT

## 2024-05-24 RX ORDER — SODIUM CHLORIDE 9 MG/ML
20 INJECTION, SOLUTION INTRAVENOUS ONCE
Status: CANCELLED | OUTPATIENT
Start: 2024-05-31

## 2024-05-24 RX ORDER — SODIUM CHLORIDE 9 MG/ML
20 INJECTION, SOLUTION INTRAVENOUS ONCE
Status: COMPLETED | OUTPATIENT
Start: 2024-05-24 | End: 2024-05-24

## 2024-05-24 RX ADMIN — SODIUM CHLORIDE 100 MG: 900 INJECTION, SOLUTION INTRAVENOUS at 13:40

## 2024-05-24 RX ADMIN — SODIUM CHLORIDE 20 ML/HR: 0.9 INJECTION, SOLUTION INTRAVENOUS at 13:40

## 2024-05-24 NOTE — PROGRESS NOTES
Pt received venofer infusion w/out any adverse effects, offers no complaints. Confirmed next appt, AVS given

## 2024-05-31 ENCOUNTER — HOSPITAL ENCOUNTER (OUTPATIENT)
Dept: INFUSION CENTER | Facility: HOSPITAL | Age: 39
End: 2024-05-31
Payer: COMMERCIAL

## 2024-05-31 VITALS
RESPIRATION RATE: 18 BRPM | SYSTOLIC BLOOD PRESSURE: 119 MMHG | OXYGEN SATURATION: 99 % | DIASTOLIC BLOOD PRESSURE: 70 MMHG | TEMPERATURE: 98.4 F | HEART RATE: 59 BPM

## 2024-05-31 DIAGNOSIS — R53.83 OTHER FATIGUE: Primary | ICD-10-CM

## 2024-05-31 DIAGNOSIS — D50.0 IRON DEFICIENCY ANEMIA DUE TO CHRONIC BLOOD LOSS: ICD-10-CM

## 2024-05-31 PROCEDURE — 96365 THER/PROPH/DIAG IV INF INIT: CPT

## 2024-05-31 RX ORDER — SODIUM CHLORIDE 9 MG/ML
20 INJECTION, SOLUTION INTRAVENOUS ONCE
Status: CANCELLED | OUTPATIENT
Start: 2024-06-07

## 2024-05-31 RX ORDER — SODIUM CHLORIDE 9 MG/ML
20 INJECTION, SOLUTION INTRAVENOUS ONCE
Status: COMPLETED | OUTPATIENT
Start: 2024-05-31 | End: 2024-05-31

## 2024-05-31 RX ADMIN — IRON SUCROSE 100 MG: 20 INJECTION, SOLUTION INTRAVENOUS at 12:48

## 2024-05-31 RX ADMIN — SODIUM CHLORIDE 20 ML/HR: 9 INJECTION, SOLUTION INTRAVENOUS at 12:48

## 2024-05-31 NOTE — PROGRESS NOTES
Star Deluna  tolerated treatment well with no complications.      Star Deluna is aware of future appt on 6/7/24 at 1300.     AVS printed and given to Star Deluna:  No (Declined by Star Deluna)

## 2024-06-07 ENCOUNTER — HOSPITAL ENCOUNTER (OUTPATIENT)
Dept: INFUSION CENTER | Facility: HOSPITAL | Age: 39
End: 2024-06-07
Payer: COMMERCIAL

## 2024-06-07 VITALS
HEART RATE: 85 BPM | SYSTOLIC BLOOD PRESSURE: 114 MMHG | TEMPERATURE: 98 F | RESPIRATION RATE: 20 BRPM | DIASTOLIC BLOOD PRESSURE: 77 MMHG

## 2024-06-07 DIAGNOSIS — D50.0 IRON DEFICIENCY ANEMIA DUE TO CHRONIC BLOOD LOSS: ICD-10-CM

## 2024-06-07 DIAGNOSIS — R53.83 OTHER FATIGUE: Primary | ICD-10-CM

## 2024-06-07 PROCEDURE — 96365 THER/PROPH/DIAG IV INF INIT: CPT

## 2024-06-07 RX ORDER — SODIUM CHLORIDE 9 MG/ML
20 INJECTION, SOLUTION INTRAVENOUS ONCE
Status: COMPLETED | OUTPATIENT
Start: 2024-06-07 | End: 2024-06-07

## 2024-06-07 RX ORDER — SODIUM CHLORIDE 9 MG/ML
20 INJECTION, SOLUTION INTRAVENOUS ONCE
OUTPATIENT
Start: 2024-06-14

## 2024-06-07 RX ADMIN — SODIUM CHLORIDE 20 ML/HR: 9 INJECTION, SOLUTION INTRAVENOUS at 12:52

## 2024-06-07 RX ADMIN — IRON SUCROSE 100 MG: 20 INJECTION, SOLUTION INTRAVENOUS at 12:56

## 2024-06-07 NOTE — PROGRESS NOTES
Pt received venofer infusion w/out any adverse effects, offers no complaints. Confirmed appt next Friday, declined AVS.

## 2024-06-08 ENCOUNTER — APPOINTMENT (OUTPATIENT)
Dept: LAB | Facility: HOSPITAL | Age: 39
End: 2024-06-08
Payer: COMMERCIAL

## 2024-06-08 DIAGNOSIS — D50.0 IRON DEFICIENCY ANEMIA DUE TO CHRONIC BLOOD LOSS: ICD-10-CM

## 2024-06-08 LAB
FERRITIN SERPL-MCNC: 78 NG/ML (ref 11–307)
IRON SATN MFR SERPL: 76 % (ref 15–50)
IRON SERPL-MCNC: 253 UG/DL (ref 50–212)
TIBC SERPL-MCNC: 333 UG/DL (ref 250–450)
UIBC SERPL-MCNC: 80 UG/DL (ref 155–355)

## 2024-06-08 PROCEDURE — 83550 IRON BINDING TEST: CPT

## 2024-06-08 PROCEDURE — 36415 COLL VENOUS BLD VENIPUNCTURE: CPT

## 2024-06-08 PROCEDURE — 82728 ASSAY OF FERRITIN: CPT

## 2024-06-08 PROCEDURE — 83540 ASSAY OF IRON: CPT

## 2024-06-10 DIAGNOSIS — D50.0 IRON DEFICIENCY ANEMIA DUE TO CHRONIC BLOOD LOSS: Primary | ICD-10-CM

## 2024-06-22 ENCOUNTER — APPOINTMENT (OUTPATIENT)
Dept: LAB | Facility: HOSPITAL | Age: 39
End: 2024-06-22
Payer: COMMERCIAL

## 2024-06-22 DIAGNOSIS — D50.0 IRON DEFICIENCY ANEMIA DUE TO CHRONIC BLOOD LOSS: ICD-10-CM

## 2024-06-22 LAB
BASOPHILS # BLD AUTO: 0.04 THOUSANDS/ÂΜL (ref 0–0.1)
BASOPHILS NFR BLD AUTO: 1 % (ref 0–1)
EOSINOPHIL # BLD AUTO: 0.13 THOUSAND/ÂΜL (ref 0–0.61)
EOSINOPHIL NFR BLD AUTO: 2 % (ref 0–6)
ERYTHROCYTE [DISTWIDTH] IN BLOOD BY AUTOMATED COUNT: 17.8 % (ref 11.6–15.1)
HCT VFR BLD AUTO: 34.4 % (ref 34.8–46.1)
HGB BLD-MCNC: 11.3 G/DL (ref 11.5–15.4)
IMM GRANULOCYTES # BLD AUTO: 0.01 THOUSAND/UL (ref 0–0.2)
IMM GRANULOCYTES NFR BLD AUTO: 0 % (ref 0–2)
LYMPHOCYTES # BLD AUTO: 1.77 THOUSANDS/ÂΜL (ref 0.6–4.47)
LYMPHOCYTES NFR BLD AUTO: 27 % (ref 14–44)
MCH RBC QN AUTO: 28.8 PG (ref 26.8–34.3)
MCHC RBC AUTO-ENTMCNC: 32.8 G/DL (ref 31.4–37.4)
MCV RBC AUTO: 88 FL (ref 82–98)
MONOCYTES # BLD AUTO: 0.45 THOUSAND/ÂΜL (ref 0.17–1.22)
MONOCYTES NFR BLD AUTO: 7 % (ref 4–12)
NEUTROPHILS # BLD AUTO: 4.18 THOUSANDS/ÂΜL (ref 1.85–7.62)
NEUTS SEG NFR BLD AUTO: 63 % (ref 43–75)
NRBC BLD AUTO-RTO: 0 /100 WBCS
PLATELET # BLD AUTO: 241 THOUSANDS/UL (ref 149–390)
PMV BLD AUTO: 10.2 FL (ref 8.9–12.7)
RBC # BLD AUTO: 3.93 MILLION/UL (ref 3.81–5.12)
WBC # BLD AUTO: 6.58 THOUSAND/UL (ref 4.31–10.16)

## 2024-06-22 PROCEDURE — 36415 COLL VENOUS BLD VENIPUNCTURE: CPT

## 2024-06-22 PROCEDURE — 85025 COMPLETE CBC W/AUTO DIFF WBC: CPT

## 2024-06-28 ENCOUNTER — TELEPHONE (OUTPATIENT)
Age: 39
End: 2024-06-28

## 2024-06-28 DIAGNOSIS — R53.83 OTHER FATIGUE: ICD-10-CM

## 2024-06-28 DIAGNOSIS — D50.0 IRON DEFICIENCY ANEMIA DUE TO CHRONIC BLOOD LOSS: Primary | ICD-10-CM

## 2024-06-28 RX ORDER — SODIUM CHLORIDE 9 MG/ML
20 INJECTION, SOLUTION INTRAVENOUS ONCE
OUTPATIENT
Start: 2024-07-05

## 2024-06-28 NOTE — TELEPHONE ENCOUNTER
[facility] called requesting that the date on pt's existing Infusion order be changed. Current order date 6/21/24 -- [facility] requesting that the date be updated to today's date 6/28/24.    If there are any issues with this request, please contact [facility] at [phone]

## 2024-07-10 DIAGNOSIS — D50.0 IRON DEFICIENCY ANEMIA DUE TO CHRONIC BLOOD LOSS: Primary | ICD-10-CM

## 2024-07-10 DIAGNOSIS — R53.83 OTHER FATIGUE: ICD-10-CM

## 2024-07-10 RX ORDER — SODIUM CHLORIDE 9 MG/ML
20 INJECTION, SOLUTION INTRAVENOUS ONCE
OUTPATIENT
Start: 2024-07-12

## 2024-08-09 ENCOUNTER — OFFICE VISIT (OUTPATIENT)
Dept: FAMILY MEDICINE CLINIC | Facility: CLINIC | Age: 39
End: 2024-08-09
Payer: COMMERCIAL

## 2024-08-09 VITALS
WEIGHT: 150 LBS | HEIGHT: 62 IN | RESPIRATION RATE: 18 BRPM | DIASTOLIC BLOOD PRESSURE: 80 MMHG | HEART RATE: 78 BPM | TEMPERATURE: 97.4 F | BODY MASS INDEX: 27.6 KG/M2 | SYSTOLIC BLOOD PRESSURE: 114 MMHG | OXYGEN SATURATION: 99 %

## 2024-08-09 DIAGNOSIS — E66.3 OVERWEIGHT (BMI 25.0-29.9): ICD-10-CM

## 2024-08-09 DIAGNOSIS — Z13.6 SCREENING FOR CARDIOVASCULAR CONDITION: ICD-10-CM

## 2024-08-09 DIAGNOSIS — D50.0 IRON DEFICIENCY ANEMIA DUE TO CHRONIC BLOOD LOSS: Primary | ICD-10-CM

## 2024-08-09 DIAGNOSIS — E53.8 B12 DEFICIENCY: ICD-10-CM

## 2024-08-09 PROBLEM — L30.4 INTERTRIGO: Status: RESOLVED | Noted: 2023-12-12 | Resolved: 2024-08-09

## 2024-08-09 PROCEDURE — 99214 OFFICE O/P EST MOD 30 MIN: CPT | Performed by: PHYSICIAN ASSISTANT

## 2024-08-09 NOTE — PROGRESS NOTES
Ambulatory Visit  Name: Star Deluna      : 1985      MRN: [id number]  Encounter Provider: Fiona Naik PA-C  Encounter Date: 2024   Encounter department: [facility] CARE [facility]    Assessment & Plan   1. Iron deficiency anemia due to chronic blood loss  Assessment & Plan:  Lab Results   Component Value Date    WBC 6.58 2024    HGB 11.3 (L) 2024    HCT 34.4 (L) 2024    MCV 88 2024     2024     Lab Results   Component Value Date    IRON 253 (H) 2024    TIBC 333 2024    FERRITIN 78 2024     Completed 6 iron infusions with symptomatic improvement and normalized iron panel levels.  Repeat CBC.  Regular menstrual periods.  Continue iron rich diet.  Consider EGD/[place] if still anemic.   Orders:  -     CBC and differential; Future; Expected date: 2024  2. B12 deficiency  Assessment & Plan:  Lab Results   Component Value Date    [id number] 255 2023     Continue B12 supplement daily.  Repeat lab.  Orders:  -     Vitamin B12; Future  3. Overweight (BMI 25.0-29.9)  Assessment & Plan:  2 pound weight gain since last visit.  Recommend diet and exercise.  4. Screening for cardiovascular condition  -     CBC and differential; Future; Expected date: 2024  -     Comprehensive metabolic panel; Future  -     Lipid panel; Future  -     TSH, 3rd generation with Free T4 reflex; Future; Expected date: 2024     History of Present Illness     Star is a 38 y.o. female with a h/o tubal ligation and anemia who presents for follow-up with iron deficiency.  No dark or tarry stool.  Did not complete mammogram yet.    LMP  24 3 days, no clots, normal, not heavy    History was conducted in Polish without the use of .          Review of Systems   Constitutional:  Negative for chills, fever and unexpected weight change.   Respiratory:  Negative for shortness of breath.    Cardiovascular:  Negative  "for chest pain.       Objective     /80 (BP Location: Left arm, Patient Position: Sitting, Cuff Size: Standard)   Pulse 78   Temp (!) 97.4 °F (36.3 °C) (Tympanic)   Resp 18   Ht 5' 2\" (1.575 m)   Wt 68 kg (150 lb)   SpO2 99%   BMI 27.44 kg/m²     Physical Exam  Vitals reviewed.   Constitutional:       Appearance: Normal appearance.   HENT:      Head: Normocephalic and atraumatic.   Cardiovascular:      Rate and Rhythm: Normal rate and regular rhythm.   Pulmonary:      Effort: Pulmonary effort is normal.      Breath sounds: Normal breath sounds.   Neurological:      Mental Status: She is alert and oriented to person, place, and time. Mental status is at baseline.       Administrative Statements           "

## 2024-08-09 NOTE — ASSESSMENT & PLAN NOTE
Lab Results   Component Value Date    WBC 6.58 06/22/2024    HGB 11.3 (L) 06/22/2024    HCT 34.4 (L) 06/22/2024    MCV 88 06/22/2024     06/22/2024     Lab Results   Component Value Date    IRON 253 (H) 06/08/2024    TIBC 333 06/08/2024    FERRITIN 78 06/08/2024     Completed 6 iron infusions with symptomatic improvement and normalized iron panel levels.  Repeat CBC.  Regular menstrual periods.  Continue iron rich diet.  Consider EGD/[place] if still anemic.

## 2024-08-09 NOTE — ASSESSMENT & PLAN NOTE
Lab Results   Component Value Date    [id number] 255 12/16/2023     Continue B12 supplement daily.  Repeat lab.

## 2024-08-10 ENCOUNTER — APPOINTMENT (OUTPATIENT)
Dept: LAB | Facility: HOSPITAL | Age: 39
End: 2024-08-10
Payer: COMMERCIAL

## 2024-08-10 DIAGNOSIS — Z13.6 SCREENING FOR CARDIOVASCULAR CONDITION: ICD-10-CM

## 2024-08-10 DIAGNOSIS — E53.8 B12 DEFICIENCY: ICD-10-CM

## 2024-08-10 DIAGNOSIS — D50.0 IRON DEFICIENCY ANEMIA DUE TO CHRONIC BLOOD LOSS: ICD-10-CM

## 2024-08-10 LAB
ALBUMIN SERPL BCG-MCNC: 4.2 G/DL (ref 3.5–5)
ALP SERPL-CCNC: 58 U/L (ref 34–104)
ALT SERPL W P-5'-P-CCNC: 13 U/L (ref 7–52)
ANION GAP SERPL CALCULATED.3IONS-SCNC: 6 MMOL/L (ref 4–13)
AST SERPL W P-5'-P-CCNC: 16 U/L (ref 13–39)
BASOPHILS # BLD AUTO: 0.04 THOUSANDS/ÂΜL (ref 0–0.1)
BASOPHILS NFR BLD AUTO: 1 % (ref 0–1)
BILIRUB SERPL-MCNC: 0.4 MG/DL (ref 0.2–1)
BUN SERPL-MCNC: 19 MG/DL (ref 5–25)
CALCIUM SERPL-MCNC: 9.7 MG/DL (ref 8.4–10.2)
CHLORIDE SERPL-SCNC: 104 MMOL/L (ref 96–108)
CHOLEST SERPL-MCNC: 183 MG/DL
CO2 SERPL-SCNC: 29 MMOL/L (ref 21–32)
CREAT SERPL-MCNC: 0.73 MG/DL (ref 0.6–1.3)
EOSINOPHIL # BLD AUTO: 0.11 THOUSAND/ÂΜL (ref 0–0.61)
EOSINOPHIL NFR BLD AUTO: 2 % (ref 0–6)
ERYTHROCYTE [DISTWIDTH] IN BLOOD BY AUTOMATED COUNT: 13.8 % (ref 11.6–15.1)
GFR SERPL CREATININE-BSD FRML MDRD: 103 ML/MIN/1.73SQ M
GLUCOSE P FAST SERPL-MCNC: 88 MG/DL (ref 65–99)
HCT VFR BLD AUTO: 39.5 % (ref 34.8–46.1)
HDLC SERPL-MCNC: 71 MG/DL
HGB BLD-MCNC: 12.2 G/DL (ref 11.5–15.4)
IMM GRANULOCYTES # BLD AUTO: 0.02 THOUSAND/UL (ref 0–0.2)
IMM GRANULOCYTES NFR BLD AUTO: 0 % (ref 0–2)
LDLC SERPL CALC-MCNC: 97 MG/DL (ref 0–100)
LYMPHOCYTES # BLD AUTO: 1.93 THOUSANDS/ÂΜL (ref 0.6–4.47)
LYMPHOCYTES NFR BLD AUTO: 34 % (ref 14–44)
MCH RBC QN AUTO: 29 PG (ref 26.8–34.3)
MCHC RBC AUTO-ENTMCNC: 30.9 G/DL (ref 31.4–37.4)
MCV RBC AUTO: 94 FL (ref 82–98)
MONOCYTES # BLD AUTO: 0.42 THOUSAND/ÂΜL (ref 0.17–1.22)
MONOCYTES NFR BLD AUTO: 7 % (ref 4–12)
NEUTROPHILS # BLD AUTO: 3.23 THOUSANDS/ÂΜL (ref 1.85–7.62)
NEUTS SEG NFR BLD AUTO: 56 % (ref 43–75)
NONHDLC SERPL-MCNC: 112 MG/DL
NRBC BLD AUTO-RTO: 0 /100 WBCS
PLATELET # BLD AUTO: 233 THOUSANDS/UL (ref 149–390)
PMV BLD AUTO: 9.6 FL (ref 8.9–12.7)
POTASSIUM SERPL-SCNC: 4.5 MMOL/L (ref 3.5–5.3)
PROT SERPL-MCNC: 7.5 G/DL (ref 6.4–8.4)
RBC # BLD AUTO: 4.21 MILLION/UL (ref 3.81–5.12)
SODIUM SERPL-SCNC: 139 MMOL/L (ref 135–147)
T4 FREE SERPL-MCNC: 0.48 NG/DL (ref 0.61–1.12)
TRIGL SERPL-MCNC: 75 MG/DL
TSH SERPL DL<=0.05 MIU/L-ACNC: 4.95 UIU/ML (ref 0.45–4.5)
VIT B12 SERPL-MCNC: 339 PG/ML (ref 180–914)
WBC # BLD AUTO: 5.75 THOUSAND/UL (ref 4.31–10.16)

## 2024-08-10 PROCEDURE — 85025 COMPLETE CBC W/AUTO DIFF WBC: CPT

## 2024-08-10 PROCEDURE — 82607 VITAMIN B-12: CPT

## 2024-08-10 PROCEDURE — 36415 COLL VENOUS BLD VENIPUNCTURE: CPT

## 2024-08-10 PROCEDURE — 84443 ASSAY THYROID STIM HORMONE: CPT

## 2024-08-10 PROCEDURE — 80053 COMPREHEN METABOLIC PANEL: CPT

## 2024-08-10 PROCEDURE — 80061 LIPID PANEL: CPT

## 2024-08-10 PROCEDURE — 84439 ASSAY OF FREE THYROXINE: CPT

## 2024-08-13 DIAGNOSIS — R79.89 ELEVATED TSH: ICD-10-CM

## 2024-08-13 DIAGNOSIS — R79.89 LOW T4: Primary | ICD-10-CM

## 2024-12-04 ENCOUNTER — TELEPHONE (OUTPATIENT)
Dept: FAMILY MEDICINE CLINIC | Facility: CLINIC | Age: 39
End: 2024-12-04

## 2024-12-04 DIAGNOSIS — D50.0 IRON DEFICIENCY ANEMIA DUE TO CHRONIC BLOOD LOSS: Primary | ICD-10-CM

## 2024-12-04 DIAGNOSIS — R79.89 ELEVATED TSH: ICD-10-CM

## 2024-12-04 NOTE — TELEPHONE ENCOUNTER
Message -----   From: Fiona Naik PA-C   Sent: 12/4/2024   9:04 AM EST   To: [facility]      I ordered labs - please have her complete prior to her annual physical scheduled - she did not complete her last few infusions so need to determine if she still needs   ----- Message -----   From: Pamela Levine RN   Sent: 12/4/2024   8:54 AM EST   To: Fiona Naik PA-C      ???   ----- Message -----   From: Fiona Naik PA-C   Sent: 12/4/2024   8:51 AM EST   To: [facility]      Please have patient complete labs 1 week prior to appointment on 12/19/24 scheduled   ----- Message -----   From: SYSTEM   Sent: 12/4/2024   1:45 AM EST   To: Fiona Naik PA-C

## 2024-12-12 ENCOUNTER — APPOINTMENT (OUTPATIENT)
Dept: LAB | Facility: HOSPITAL | Age: 39
End: 2024-12-12
Payer: COMMERCIAL

## 2024-12-12 DIAGNOSIS — D50.0 IRON DEFICIENCY ANEMIA DUE TO CHRONIC BLOOD LOSS: ICD-10-CM

## 2024-12-12 DIAGNOSIS — R79.89 ELEVATED TSH: ICD-10-CM

## 2024-12-12 LAB
BASOPHILS # BLD AUTO: 0.04 THOUSANDS/ÂΜL (ref 0–0.1)
BASOPHILS NFR BLD AUTO: 1 % (ref 0–1)
EOSINOPHIL # BLD AUTO: 0.14 THOUSAND/ÂΜL (ref 0–0.61)
EOSINOPHIL NFR BLD AUTO: 2 % (ref 0–6)
ERYTHROCYTE [DISTWIDTH] IN BLOOD BY AUTOMATED COUNT: 11.9 % (ref 11.6–15.1)
HCT VFR BLD AUTO: 34.5 % (ref 34.8–46.1)
HGB BLD-MCNC: 11.4 G/DL (ref 11.5–15.4)
IMM GRANULOCYTES # BLD AUTO: 0.01 THOUSAND/UL (ref 0–0.2)
IMM GRANULOCYTES NFR BLD AUTO: 0 % (ref 0–2)
IRON SATN MFR SERPL: 10 % (ref 15–50)
IRON SERPL-MCNC: 32 UG/DL (ref 50–212)
LYMPHOCYTES # BLD AUTO: 2.56 THOUSANDS/ÂΜL (ref 0.6–4.47)
LYMPHOCYTES NFR BLD AUTO: 36 % (ref 14–44)
MCH RBC QN AUTO: 30.3 PG (ref 26.8–34.3)
MCHC RBC AUTO-ENTMCNC: 33 G/DL (ref 31.4–37.4)
MCV RBC AUTO: 92 FL (ref 82–98)
MONOCYTES # BLD AUTO: 0.45 THOUSAND/ÂΜL (ref 0.17–1.22)
MONOCYTES NFR BLD AUTO: 6 % (ref 4–12)
NEUTROPHILS # BLD AUTO: 3.94 THOUSANDS/ÂΜL (ref 1.85–7.62)
NEUTS SEG NFR BLD AUTO: 55 % (ref 43–75)
NRBC BLD AUTO-RTO: 0 /100 WBCS
PLATELET # BLD AUTO: 242 THOUSANDS/UL (ref 149–390)
PMV BLD AUTO: 9.5 FL (ref 8.9–12.7)
RBC # BLD AUTO: 3.76 MILLION/UL (ref 3.81–5.12)
T4 FREE SERPL-MCNC: 0.89 NG/DL (ref 0.61–1.12)
TIBC SERPL-MCNC: 326 UG/DL (ref 250–450)
TSH SERPL DL<=0.05 MIU/L-ACNC: 6.53 UIU/ML (ref 0.45–4.5)
UIBC SERPL-MCNC: 294 UG/DL (ref 155–355)
WBC # BLD AUTO: 7.14 THOUSAND/UL (ref 4.31–10.16)

## 2024-12-12 PROCEDURE — 82728 ASSAY OF FERRITIN: CPT

## 2024-12-12 PROCEDURE — 85025 COMPLETE CBC W/AUTO DIFF WBC: CPT

## 2024-12-12 PROCEDURE — 83540 ASSAY OF IRON: CPT

## 2024-12-12 PROCEDURE — 84443 ASSAY THYROID STIM HORMONE: CPT

## 2024-12-12 PROCEDURE — 83550 IRON BINDING TEST: CPT

## 2024-12-12 PROCEDURE — 36415 COLL VENOUS BLD VENIPUNCTURE: CPT

## 2024-12-12 PROCEDURE — 84439 ASSAY OF FREE THYROXINE: CPT

## 2024-12-13 LAB — FERRITIN SERPL-MCNC: 8 NG/ML (ref 11–307)

## 2024-12-16 ENCOUNTER — RESULTS FOLLOW-UP (OUTPATIENT)
Dept: FAMILY MEDICINE CLINIC | Facility: CLINIC | Age: 39
End: 2024-12-16

## 2024-12-19 ENCOUNTER — OFFICE VISIT (OUTPATIENT)
Dept: FAMILY MEDICINE CLINIC | Facility: CLINIC | Age: 39
End: 2024-12-19
Payer: COMMERCIAL

## 2024-12-19 VITALS
HEIGHT: 63 IN | DIASTOLIC BLOOD PRESSURE: 58 MMHG | SYSTOLIC BLOOD PRESSURE: 123 MMHG | TEMPERATURE: 98.5 F | BODY MASS INDEX: 26.4 KG/M2 | RESPIRATION RATE: 18 BRPM | OXYGEN SATURATION: 99 % | WEIGHT: 149 LBS | HEART RATE: 80 BPM

## 2024-12-19 DIAGNOSIS — R79.89 ELEVATED TSH: ICD-10-CM

## 2024-12-19 DIAGNOSIS — E66.3 OVERWEIGHT (BMI 25.0-29.9): ICD-10-CM

## 2024-12-19 DIAGNOSIS — Z00.00 ANNUAL PHYSICAL EXAM: Primary | ICD-10-CM

## 2024-12-19 DIAGNOSIS — Z12.31 SCREENING MAMMOGRAM FOR BREAST CANCER: ICD-10-CM

## 2024-12-19 DIAGNOSIS — D50.0 IRON DEFICIENCY ANEMIA DUE TO CHRONIC BLOOD LOSS: ICD-10-CM

## 2024-12-19 DIAGNOSIS — E53.8 B12 DEFICIENCY: ICD-10-CM

## 2024-12-19 PROBLEM — R53.83 OTHER FATIGUE: Status: RESOLVED | Noted: 2022-04-14 | Resolved: 2024-12-19

## 2024-12-19 PROCEDURE — 99214 OFFICE O/P EST MOD 30 MIN: CPT | Performed by: PHYSICIAN ASSISTANT

## 2024-12-19 PROCEDURE — 99396 PREV VISIT EST AGE 40-64: CPT | Performed by: PHYSICIAN ASSISTANT

## 2024-12-19 NOTE — ASSESSMENT & PLAN NOTE
Lab Results   Component Value Date    IRON 32 (L) 12/12/2024    TIBC 326 12/12/2024    FERRITIN 8 (L) 12/12/2024     Lab Results   Component Value Date    WBC 7.14 12/12/2024    HGB 11.4 (L) 12/12/2024    HCT 34.5 (L) 12/12/2024    MCV 92 12/12/2024     12/12/2024     Recent worsening after stopping iron infusions, has improved with last labs in August after completing 8 infusions in late June.  Prior to most recent labs she did have a heavy period lasting 4 days.  Having regular heavy periods.  Having to change medium pads not fully soaked about 6 times daily.  Restarted iron supplementation since last labs, recommend doubling dose on days of her period.  Repeat labs in 8 weeks.  Orders:  •  CBC and differential; Future  •  Iron Panel (Includes Ferritin, Iron Sat%, Iron, and TIBC); Future

## 2024-12-19 NOTE — PROGRESS NOTES
Adult Annual Physical  Name: Star Deluna      : 1985      MRN: [id number]  Encounter Provider: Fiona Naik PA-C  Encounter Date: 2024   Encounter department: [facility] PRIMARY CARE [facility]    Assessment & Plan  Annual physical exam         Iron deficiency anemia due to chronic blood loss  Lab Results   Component Value Date    IRON 32 (L) 2024    TIBC 326 2024    FERRITIN 8 (L) 2024     Lab Results   Component Value Date    WBC 7.14 2024    HGB 11.4 (L) 2024    HCT 34.5 (L) 2024    MCV 92 2024     2024     Recent worsening after stopping iron infusions, has improved with last labs in August after completing 8 infusions in late .  Prior to most recent labs she did have a heavy period lasting 4 days.  Having regular heavy periods.  Having to change medium pads not fully soaked about 6 times daily.  Restarted iron supplementation since last labs, recommend doubling dose on days of her period.  Repeat labs in 8 weeks.  Orders:  •  CBC and differential; Future  •  Iron Panel (Includes Ferritin, Iron Sat%, Iron, and TIBC); Future    Elevated TSH  Lab Results   Component Value Date    [id number] 6.533 (H) 2024     Clinically euthyroid besides dry skin.  Repeat TSH with TPO antibodies.  Orders:  •  TSH, 3rd generation with Free T4 reflex; Future  •  Anti-microsomal antibody; Future    B12 deficiency  Lab Results   Component Value Date    [id number] 339 08/10/2024     Continue B12 supplement daily.       Overweight (BMI 25.0-29.9)  Stable weight since last visit, continue with well-balanced diet and exercise.       Screening mammogram for breast cancer  Highly encourage patient complete mammogram for breast cancer screening.  She will schedule.  Orders:  •  Mammo screening bilateral w 3d and cad; Future    Immunizations and preventive care screenings were discussed with patient today. Appropriate education  was printed on patient's after visit summary.    Counseling:  Alcohol/drug use: discussed moderation in alcohol intake, the recommendations for healthy alcohol use, and avoidance of illicit drug use.  Dental Health: discussed importance of regular tooth brushing, flossing, and dental visits.  Injury prevention: discussed safety/seat belts, safety helmets, smoke detectors, carbon monoxide detectors, and smoking near bedding or upholstery.  Sexual health: discussed sexually transmitted diseases, partner selection, use of condoms, avoidance of unintended pregnancy, and contraceptive alternatives.  Exercise: the importance of regular exercise/physical activity was discussed. Recommend exercise 3-5 times per week for at least 30 minutes.          History of Present Illness     Adult Annual Physical:  Patient presents for annual physical. Star is a 39 y.o. female with a h/o tubal ligation and anemia who presents for annual physical and follow-up with iron deficiency.  No dark or tarry stool.  Did not complete mammogram yet.  She lost her prescription, we will schedule this time.  Feeling well, not fatigue.  Restarted iron supplement after hearing about last labs.    LMP  12/10/24 4 days, some clots, heavier than usual    History was conducted in Setswana without the use of ..     Diet and Physical Activity:  - Diet/Nutrition: well balanced diet.  - Exercise: walking.    Depression Screening:  - PHQ-2 Score: 0    General Health:  - Sleep: sleeps well.  - Hearing: normal hearing bilateral ears.  - Vision: no vision problems.  - Dental: regular dental visits.    /GYN Health:  - Follows with GYN: yes.   - Menopause: premenopausal.   - Last menstrual cycle: 12/10/2024.   - History of STDs: no    Advanced Care Planning:  - Has an advanced directive?: no    - Has a durable medical POA?: no    - ACP document given to patient?: no      Review of Systems   Constitutional:  Negative for chills and fever.   HENT:   "Negative for ear pain and sore throat.    Eyes:  Negative for pain and visual disturbance.   Respiratory:  Negative for cough and shortness of breath.    Cardiovascular:  Negative for chest pain and palpitations.   Gastrointestinal:  Negative for abdominal pain and vomiting.   Genitourinary:  Negative for dysuria and hematuria.   Musculoskeletal:  Negative for arthralgias and back pain.   Skin:  Negative for color change and rash.   Neurological:  Negative for seizures and syncope.   All other systems reviewed and are negative.        Objective   /58 (BP Location: Left arm, Patient Position: Sitting, Cuff Size: Standard)   Pulse 80   Temp 98.5 °F (36.9 °C) (Temporal)   Resp 18   Ht 5' 2.5\" (1.588 m)   Wt 67.6 kg (149 lb)   LMP 12/10/2024   SpO2 99%   BMI 26.82 kg/m²     Physical Exam  Vitals and nursing note reviewed.   Constitutional:       General: She is not in acute distress.     Appearance: She is well-developed.   HENT:      Head: Normocephalic and atraumatic.      Right Ear: Tympanic membrane, ear canal and external ear normal.      Left Ear: Tympanic membrane, ear canal and external ear normal.      Mouth/Throat:      Mouth: Mucous membranes are moist.   Eyes:      Extraocular Movements: Extraocular movements intact.      Conjunctiva/sclera: Conjunctivae normal.      Pupils: Pupils are equal, round, and reactive to light.   Cardiovascular:      Rate and Rhythm: Normal rate and regular rhythm.      Heart sounds: No murmur heard.  Pulmonary:      Effort: Pulmonary effort is normal. No respiratory distress.      Breath sounds: Normal breath sounds.   Abdominal:      Palpations: Abdomen is soft.      Tenderness: There is no abdominal tenderness.   Musculoskeletal:         General: No swelling.      Cervical back: Neck supple.   Skin:     General: Skin is warm and dry.      Capillary Refill: Capillary refill takes less than 2 seconds.   Neurological:      Mental Status: She is alert.   Psychiatric:  "        Mood and Affect: Mood normal.

## 2024-12-19 NOTE — ASSESSMENT & PLAN NOTE
Lab Results   Component Value Date    [id number] 6.533 (H) 12/12/2024     Clinically euthyroid besides dry skin.  Repeat TSH with TPO antibodies.  Orders:  •  TSH, 3rd generation with Free T4 reflex; Future  •  Anti-microsomal antibody; Future

## 2024-12-19 NOTE — PATIENT INSTRUCTIONS
Estimada Brendaliz, jeffery niveles en la arias indican que tiene anemia. Usted no tiene suficiente david. Por favor, triston ferrous sulfate (65mg de david) que es un suplemento de venta david dos veces cada semana.     Tambien, le recomiendo que aumente los siguientes alimentos en christian dieta:  · Tofu  · Frijoles y lentejas  · Verduras de hojas elvin oscuro  Combinar brian de david con alimentos ricos en vitamina C puede ayudar a absorber el david por ejemplo:  · Frutas cítricas eduardo las naranjas  · Bayas  · Papaya  · Tomates  · Batatas  · Brócoli  · Repollo  · Verduras de hojas elvin oscuro  https://medlineplus.gov/Occitan/ency/article/085306.htm

## 2024-12-19 NOTE — ASSESSMENT & PLAN NOTE
Lab Results   Component Value Date    [id number] 339 08/10/2024     Continue B12 supplement daily.

## 2025-01-13 ENCOUNTER — APPOINTMENT (OUTPATIENT)
Dept: LAB | Facility: HOSPITAL | Age: 40
End: 2025-01-13
Payer: COMMERCIAL

## 2025-01-13 DIAGNOSIS — D50.0 IRON DEFICIENCY ANEMIA DUE TO CHRONIC BLOOD LOSS: ICD-10-CM

## 2025-01-13 DIAGNOSIS — R79.89 ELEVATED TSH: ICD-10-CM

## 2025-01-13 LAB
BASOPHILS # BLD AUTO: 0.04 THOUSANDS/ΜL (ref 0–0.1)
BASOPHILS NFR BLD AUTO: 1 % (ref 0–1)
EOSINOPHIL # BLD AUTO: 0.16 THOUSAND/ΜL (ref 0–0.61)
EOSINOPHIL NFR BLD AUTO: 3 % (ref 0–6)
ERYTHROCYTE [DISTWIDTH] IN BLOOD BY AUTOMATED COUNT: 12.4 % (ref 11.6–15.1)
FERRITIN SERPL-MCNC: 14 NG/ML (ref 11–307)
HCT VFR BLD AUTO: 39.4 % (ref 34.8–46.1)
HGB BLD-MCNC: 12.4 G/DL (ref 11.5–15.4)
IMM GRANULOCYTES # BLD AUTO: 0.02 THOUSAND/UL (ref 0–0.2)
IMM GRANULOCYTES NFR BLD AUTO: 0 % (ref 0–2)
IRON SATN MFR SERPL: 12 % (ref 15–50)
IRON SERPL-MCNC: 41 UG/DL (ref 50–212)
LYMPHOCYTES # BLD AUTO: 1.69 THOUSANDS/ΜL (ref 0.6–4.47)
LYMPHOCYTES NFR BLD AUTO: 30 % (ref 14–44)
MCH RBC QN AUTO: 30 PG (ref 26.8–34.3)
MCHC RBC AUTO-ENTMCNC: 31.5 G/DL (ref 31.4–37.4)
MCV RBC AUTO: 95 FL (ref 82–98)
MONOCYTES # BLD AUTO: 0.36 THOUSAND/ΜL (ref 0.17–1.22)
MONOCYTES NFR BLD AUTO: 6 % (ref 4–12)
NEUTROPHILS # BLD AUTO: 3.33 THOUSANDS/ΜL (ref 1.85–7.62)
NEUTS SEG NFR BLD AUTO: 60 % (ref 43–75)
NRBC BLD AUTO-RTO: 0 /100 WBCS
PLATELET # BLD AUTO: 233 THOUSANDS/UL (ref 149–390)
PMV BLD AUTO: 9.9 FL (ref 8.9–12.7)
RBC # BLD AUTO: 4.14 MILLION/UL (ref 3.81–5.12)
T4 FREE SERPL-MCNC: 0.63 NG/DL (ref 0.61–1.12)
TIBC SERPL-MCNC: 351.4 UG/DL (ref 250–450)
TRANSFERRIN SERPL-MCNC: 251 MG/DL (ref 203–362)
TSH SERPL DL<=0.05 MIU/L-ACNC: 5.08 UIU/ML (ref 0.45–4.5)
UIBC SERPL-MCNC: 310 UG/DL (ref 155–355)
WBC # BLD AUTO: 5.6 THOUSAND/UL (ref 4.31–10.16)

## 2025-01-13 PROCEDURE — 82728 ASSAY OF FERRITIN: CPT

## 2025-01-13 PROCEDURE — 83550 IRON BINDING TEST: CPT

## 2025-01-13 PROCEDURE — 36415 COLL VENOUS BLD VENIPUNCTURE: CPT

## 2025-01-13 PROCEDURE — 84439 ASSAY OF FREE THYROXINE: CPT

## 2025-01-13 PROCEDURE — 84443 ASSAY THYROID STIM HORMONE: CPT

## 2025-01-13 PROCEDURE — 83540 ASSAY OF IRON: CPT

## 2025-01-13 PROCEDURE — 86376 MICROSOMAL ANTIBODY EACH: CPT

## 2025-01-13 PROCEDURE — 85025 COMPLETE CBC W/AUTO DIFF WBC: CPT

## 2025-01-15 ENCOUNTER — RESULTS FOLLOW-UP (OUTPATIENT)
Dept: FAMILY MEDICINE CLINIC | Facility: CLINIC | Age: 40
End: 2025-01-15

## 2025-01-15 DIAGNOSIS — E06.3 HYPOTHYROIDISM DUE TO HASHIMOTO THYROIDITIS: Primary | ICD-10-CM

## 2025-01-15 LAB — THYROPEROXIDASE AB SERPL-ACNC: 80 IU/ML (ref 0–34)

## 2025-01-15 RX ORDER — LEVOTHYROXINE SODIUM 25 UG/1
25 TABLET ORAL
Qty: 90 TABLET | Refills: 1 | Status: SHIPPED | OUTPATIENT
Start: 2025-01-15

## 2025-03-22 ENCOUNTER — APPOINTMENT (OUTPATIENT)
Dept: LAB | Facility: HOSPITAL | Age: 40
End: 2025-03-22
Payer: COMMERCIAL

## 2025-03-22 DIAGNOSIS — E06.3 HYPOTHYROIDISM DUE TO HASHIMOTO THYROIDITIS: ICD-10-CM

## 2025-03-22 LAB — TSH SERPL DL<=0.05 MIU/L-ACNC: 3.53 UIU/ML (ref 0.45–4.5)

## 2025-03-22 PROCEDURE — 36415 COLL VENOUS BLD VENIPUNCTURE: CPT

## 2025-03-22 PROCEDURE — 84443 ASSAY THYROID STIM HORMONE: CPT

## 2025-03-27 ENCOUNTER — RESULTS FOLLOW-UP (OUTPATIENT)
Dept: FAMILY MEDICINE CLINIC | Facility: CLINIC | Age: 40
End: 2025-03-27

## 2025-03-27 DIAGNOSIS — E06.3 HYPOTHYROIDISM DUE TO HASHIMOTO THYROIDITIS: Primary | ICD-10-CM

## 2025-03-27 DIAGNOSIS — D50.0 IRON DEFICIENCY ANEMIA DUE TO CHRONIC BLOOD LOSS: ICD-10-CM

## 2025-03-27 DIAGNOSIS — E53.8 B12 DEFICIENCY: ICD-10-CM

## 2025-04-05 ENCOUNTER — APPOINTMENT (OUTPATIENT)
Dept: LAB | Facility: HOSPITAL | Age: 40
End: 2025-04-05
Payer: COMMERCIAL

## 2025-04-05 DIAGNOSIS — D50.0 IRON DEFICIENCY ANEMIA DUE TO CHRONIC BLOOD LOSS: ICD-10-CM

## 2025-04-05 DIAGNOSIS — E53.8 B12 DEFICIENCY: ICD-10-CM

## 2025-04-05 DIAGNOSIS — E06.3 HYPOTHYROIDISM DUE TO HASHIMOTO THYROIDITIS: ICD-10-CM

## 2025-04-05 LAB
BASOPHILS # BLD AUTO: 0.04 THOUSANDS/ÂΜL (ref 0–0.1)
BASOPHILS NFR BLD AUTO: 1 % (ref 0–1)
EOSINOPHIL # BLD AUTO: 0.12 THOUSAND/ÂΜL (ref 0–0.61)
EOSINOPHIL NFR BLD AUTO: 2 % (ref 0–6)
ERYTHROCYTE [DISTWIDTH] IN BLOOD BY AUTOMATED COUNT: 11.9 % (ref 11.6–15.1)
FERRITIN SERPL-MCNC: 7 NG/ML (ref 11–307)
HCT VFR BLD AUTO: 38.4 % (ref 34.8–46.1)
HGB BLD-MCNC: 12.2 G/DL (ref 11.5–15.4)
IMM GRANULOCYTES # BLD AUTO: 0.01 THOUSAND/UL (ref 0–0.2)
IMM GRANULOCYTES NFR BLD AUTO: 0 % (ref 0–2)
LYMPHOCYTES # BLD AUTO: 1.51 THOUSANDS/ÂΜL (ref 0.6–4.47)
LYMPHOCYTES NFR BLD AUTO: 30 % (ref 14–44)
MCH RBC QN AUTO: 29.5 PG (ref 26.8–34.3)
MCHC RBC AUTO-ENTMCNC: 31.8 G/DL (ref 31.4–37.4)
MCV RBC AUTO: 93 FL (ref 82–98)
MONOCYTES # BLD AUTO: 0.36 THOUSAND/ÂΜL (ref 0.17–1.22)
MONOCYTES NFR BLD AUTO: 7 % (ref 4–12)
NEUTROPHILS # BLD AUTO: 3.06 THOUSANDS/ÂΜL (ref 1.85–7.62)
NEUTS SEG NFR BLD AUTO: 60 % (ref 43–75)
NRBC BLD AUTO-RTO: 0 /100 WBCS
PLATELET # BLD AUTO: 236 THOUSANDS/UL (ref 149–390)
PMV BLD AUTO: 9.9 FL (ref 8.9–12.7)
RBC # BLD AUTO: 4.14 MILLION/UL (ref 3.81–5.12)
VIT B12 SERPL-MCNC: 373 PG/ML (ref 180–914)
WBC # BLD AUTO: 5.1 THOUSAND/UL (ref 4.31–10.16)

## 2025-04-05 PROCEDURE — 82728 ASSAY OF FERRITIN: CPT

## 2025-04-05 PROCEDURE — 82607 VITAMIN B-12: CPT

## 2025-04-05 PROCEDURE — 85025 COMPLETE CBC W/AUTO DIFF WBC: CPT

## 2025-04-05 PROCEDURE — 36415 COLL VENOUS BLD VENIPUNCTURE: CPT

## 2025-04-07 ENCOUNTER — RESULTS FOLLOW-UP (OUTPATIENT)
Dept: FAMILY MEDICINE CLINIC | Facility: CLINIC | Age: 40
End: 2025-04-07

## 2025-04-07 DIAGNOSIS — D50.0 IRON DEFICIENCY ANEMIA DUE TO CHRONIC BLOOD LOSS: Primary | ICD-10-CM

## 2025-05-24 ENCOUNTER — APPOINTMENT (OUTPATIENT)
Dept: LAB | Facility: HOSPITAL | Age: 40
End: 2025-05-24
Payer: COMMERCIAL

## 2025-05-24 DIAGNOSIS — D50.0 IRON DEFICIENCY ANEMIA DUE TO CHRONIC BLOOD LOSS: ICD-10-CM

## 2025-05-24 LAB
BASOPHILS # BLD AUTO: 0.03 THOUSANDS/ÂΜL (ref 0–0.1)
BASOPHILS NFR BLD AUTO: 1 % (ref 0–1)
EOSINOPHIL # BLD AUTO: 0.13 THOUSAND/ÂΜL (ref 0–0.61)
EOSINOPHIL NFR BLD AUTO: 3 % (ref 0–6)
ERYTHROCYTE [DISTWIDTH] IN BLOOD BY AUTOMATED COUNT: 12.4 % (ref 11.6–15.1)
FERRITIN SERPL-MCNC: 7 NG/ML (ref 30–307)
HCT VFR BLD AUTO: 34.6 % (ref 34.8–46.1)
HGB BLD-MCNC: 11.3 G/DL (ref 11.5–15.4)
IMM GRANULOCYTES # BLD AUTO: 0.02 THOUSAND/UL (ref 0–0.2)
IMM GRANULOCYTES NFR BLD AUTO: 0 % (ref 0–2)
IRON SATN MFR SERPL: 20 % (ref 15–50)
IRON SERPL-MCNC: 73 UG/DL (ref 50–212)
LYMPHOCYTES # BLD AUTO: 1.56 THOUSANDS/ÂΜL (ref 0.6–4.47)
LYMPHOCYTES NFR BLD AUTO: 31 % (ref 14–44)
MCH RBC QN AUTO: 30.4 PG (ref 26.8–34.3)
MCHC RBC AUTO-ENTMCNC: 32.7 G/DL (ref 31.4–37.4)
MCV RBC AUTO: 93 FL (ref 82–98)
MONOCYTES # BLD AUTO: 0.44 THOUSAND/ÂΜL (ref 0.17–1.22)
MONOCYTES NFR BLD AUTO: 9 % (ref 4–12)
NEUTROPHILS # BLD AUTO: 2.83 THOUSANDS/ÂΜL (ref 1.85–7.62)
NEUTS SEG NFR BLD AUTO: 56 % (ref 43–75)
NRBC BLD AUTO-RTO: 0 /100 WBCS
PLATELET # BLD AUTO: 239 THOUSANDS/UL (ref 149–390)
PMV BLD AUTO: 9.5 FL (ref 8.9–12.7)
RBC # BLD AUTO: 3.72 MILLION/UL (ref 3.81–5.12)
TIBC SERPL-MCNC: 357 UG/DL (ref 250–450)
TRANSFERRIN SERPL-MCNC: 255 MG/DL (ref 203–362)
TSH SERPL DL<=0.05 MIU/L-ACNC: 3.28 UIU/ML (ref 0.45–4.5)
UIBC SERPL-MCNC: 284 UG/DL (ref 155–355)
WBC # BLD AUTO: 5.01 THOUSAND/UL (ref 4.31–10.16)

## 2025-05-24 PROCEDURE — 85025 COMPLETE CBC W/AUTO DIFF WBC: CPT

## 2025-05-24 PROCEDURE — 84443 ASSAY THYROID STIM HORMONE: CPT

## 2025-05-24 PROCEDURE — 82728 ASSAY OF FERRITIN: CPT

## 2025-05-24 PROCEDURE — 83550 IRON BINDING TEST: CPT

## 2025-05-24 PROCEDURE — 83540 ASSAY OF IRON: CPT

## 2025-05-27 ENCOUNTER — RESULTS FOLLOW-UP (OUTPATIENT)
Dept: FAMILY MEDICINE CLINIC | Facility: CLINIC | Age: 40
End: 2025-05-27

## 2025-05-27 DIAGNOSIS — D50.0 IRON DEFICIENCY ANEMIA DUE TO CHRONIC BLOOD LOSS: Primary | ICD-10-CM

## 2025-05-27 RX ORDER — FERROUS SULFATE 324(65)MG
324 TABLET, DELAYED RELEASE (ENTERIC COATED) ORAL
Qty: 60 TABLET | Refills: 5 | Status: SHIPPED | OUTPATIENT
Start: 2025-05-27